# Patient Record
Sex: FEMALE | Race: WHITE | NOT HISPANIC OR LATINO | Employment: UNEMPLOYED | URBAN - NONMETROPOLITAN AREA
[De-identification: names, ages, dates, MRNs, and addresses within clinical notes are randomized per-mention and may not be internally consistent; named-entity substitution may affect disease eponyms.]

---

## 2022-06-21 LAB — EXT SARS-COV-2: NEGATIVE

## 2022-11-08 PROBLEM — M25.531 RIGHT WRIST PAIN: Status: ACTIVE | Noted: 2022-11-08

## 2023-02-22 PROBLEM — I25.10 CAD (CORONARY ARTERY DISEASE): Status: ACTIVE | Noted: 2023-02-22

## 2023-03-09 PROBLEM — Z00.8 MEDICAL CLEARANCE FOR PSYCHIATRIC ADMISSION: Status: RESOLVED | Noted: 2023-02-22 | Resolved: 2023-03-09

## 2023-05-01 PROBLEM — M25.572 ACUTE LEFT ANKLE PAIN: Status: ACTIVE | Noted: 2023-05-01

## 2023-05-25 DIAGNOSIS — D64.9 ANEMIA: ICD-10-CM

## 2023-05-25 RX ORDER — FERROUS SULFATE 325(65) MG
TABLET ORAL
Qty: 30 TABLET | Refills: 0 | Status: SHIPPED | OUTPATIENT
Start: 2023-05-25

## 2023-05-30 ENCOUNTER — OFFICE VISIT (OUTPATIENT)
Dept: OBGYN CLINIC | Facility: CLINIC | Age: 43
End: 2023-05-30

## 2023-05-30 VITALS
SYSTOLIC BLOOD PRESSURE: 120 MMHG | DIASTOLIC BLOOD PRESSURE: 80 MMHG | BODY MASS INDEX: 38.46 KG/M2 | HEART RATE: 84 BPM | WEIGHT: 209 LBS | TEMPERATURE: 97.4 F | HEIGHT: 62 IN

## 2023-05-30 DIAGNOSIS — M76.61 ACHILLES TENDINITIS OF BOTH LOWER EXTREMITIES: ICD-10-CM

## 2023-05-30 DIAGNOSIS — G57.52 TARSAL TUNNEL SYNDROME OF LEFT SIDE: Primary | ICD-10-CM

## 2023-05-30 DIAGNOSIS — E11.42 TYPE 2 DIABETES MELLITUS WITH DIABETIC POLYNEUROPATHY, WITHOUT LONG-TERM CURRENT USE OF INSULIN (HCC): ICD-10-CM

## 2023-05-30 DIAGNOSIS — M79.672 PAIN OF LEFT HEEL: ICD-10-CM

## 2023-05-30 DIAGNOSIS — M79.671 PAIN OF RIGHT HEEL: ICD-10-CM

## 2023-05-30 DIAGNOSIS — M76.62 ACHILLES TENDINITIS OF BOTH LOWER EXTREMITIES: ICD-10-CM

## 2023-05-30 DIAGNOSIS — M25.572 ACUTE LEFT ANKLE PAIN: ICD-10-CM
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
## 2023-05-31 DIAGNOSIS — I95.9 HYPOTENSION: ICD-10-CM

## 2023-05-31 RX ORDER — FLUDROCORTISONE ACETATE 0.1 MG/1
0.1 TABLET ORAL 2 TIMES DAILY
Qty: 60 TABLET | Refills: 0 | Status: SHIPPED | OUTPATIENT
Start: 2023-05-31 | End: 2023-06-26

## 2023-06-05 ENCOUNTER — PATIENT MESSAGE (OUTPATIENT)
Dept: INTERNAL MEDICINE CLINIC | Facility: CLINIC | Age: 43
End: 2023-06-05

## 2023-06-05 DIAGNOSIS — M54.42 CHRONIC BILATERAL LOW BACK PAIN WITH BILATERAL SCIATICA: ICD-10-CM

## 2023-06-05 DIAGNOSIS — G89.29 CHRONIC BILATERAL LOW BACK PAIN WITHOUT SCIATICA: ICD-10-CM

## 2023-06-05 DIAGNOSIS — M54.50 CHRONIC LOW BACK PAIN WITHOUT SCIATICA, UNSPECIFIED BACK PAIN LATERALITY: Primary | ICD-10-CM

## 2023-06-05 DIAGNOSIS — G89.29 CHRONIC LOW BACK PAIN WITHOUT SCIATICA, UNSPECIFIED BACK PAIN LATERALITY: Primary | ICD-10-CM

## 2023-06-05 DIAGNOSIS — M54.41 CHRONIC BILATERAL LOW BACK PAIN WITH BILATERAL SCIATICA: ICD-10-CM

## 2023-06-05 DIAGNOSIS — R13.10 DYSPHAGIA: ICD-10-CM

## 2023-06-05 DIAGNOSIS — G89.29 CHRONIC BILATERAL LOW BACK PAIN WITH BILATERAL SCIATICA: ICD-10-CM

## 2023-06-05 DIAGNOSIS — M54.50 CHRONIC BILATERAL LOW BACK PAIN WITHOUT SCIATICA: ICD-10-CM

## 2023-06-05 RX ORDER — PANTOPRAZOLE SODIUM 20 MG/1
20 TABLET, DELAYED RELEASE ORAL
Qty: 60 TABLET | Refills: 0 | Status: SHIPPED | OUTPATIENT
Start: 2023-06-05 | End: 2023-07-05

## 2023-06-05 RX ORDER — METHOCARBAMOL 500 MG/1
500 TABLET, FILM COATED ORAL 3 TIMES DAILY
Qty: 90 TABLET | Refills: 1 | Status: SHIPPED | OUTPATIENT
Start: 2023-06-05

## 2023-06-06 ENCOUNTER — EVALUATION (OUTPATIENT)
Dept: PHYSICAL THERAPY | Facility: CLINIC | Age: 43
End: 2023-06-06
Payer: COMMERCIAL

## 2023-06-06 DIAGNOSIS — M25.572 ACUTE LEFT ANKLE PAIN: Primary | ICD-10-CM

## 2023-06-06 DIAGNOSIS — G57.52 TARSAL TUNNEL SYNDROME OF LEFT SIDE: ICD-10-CM

## 2023-06-06 DIAGNOSIS — M76.61 ACHILLES TENDINITIS OF BOTH LOWER EXTREMITIES: ICD-10-CM

## 2023-06-06 DIAGNOSIS — M79.671 PAIN OF RIGHT HEEL: ICD-10-CM

## 2023-06-06 DIAGNOSIS — E11.42 TYPE 2 DIABETES MELLITUS WITH DIABETIC POLYNEUROPATHY, WITHOUT LONG-TERM CURRENT USE OF INSULIN (HCC): ICD-10-CM

## 2023-06-06 DIAGNOSIS — M79.672 PAIN OF LEFT HEEL: ICD-10-CM

## 2023-06-06 DIAGNOSIS — M76.62 ACHILLES TENDINITIS OF BOTH LOWER EXTREMITIES: ICD-10-CM

## 2023-06-06 PROCEDURE — 97162 PT EVAL MOD COMPLEX 30 MIN: CPT | Performed by: PHYSICAL THERAPIST

## 2023-06-06 PROCEDURE — 97110 THERAPEUTIC EXERCISES: CPT | Performed by: PHYSICAL THERAPIST

## 2023-06-06 NOTE — PROGRESS NOTES
PT Evaluation     Today's date: 2023  Patient name: Denis Gray  : 1980  MRN: [id number]  Referring provider: Becki Rome MD  Dx:   Encounter Diagnosis     ICD-10-CM    1  Acute left ankle pain  M25 [id number] Ambulatory Referral to Physical Therapy      2  Pain of right heel  M79 [id number] Ambulatory Referral to Physical Therapy      3  Achilles tendinitis of both lower extremities  M76 [id number] Ambulatory Referral to Physical Therapy    [id number]       4  Type 2 diabetes mellitus with diabetic polyneuropathy, without long-term current use of insulin (HCC)  E11 [id number] Ambulatory Referral to Physical Therapy      5  Tarsal tunnel syndrome of left side  G57 [id number] Ambulatory Referral to Physical Therapy      6  Pain of left heel  M79 [id number] Ambulatory Referral to Physical Therapy                     Assessment  Assessment details: Denis Gray is a pleasant 40 y o  female with pmh of DMII/diabetic neuropathy presenting to PT with cc of worsening acute L ankle pain and stiffness  Pt  States pain began approx 2 months ago and is associated with forefoot numbness/shooting pain as well as stiffness  Upon examination, patient was found to have objective deficits as listed below and is displaying ss consistent with achilles tendinopathy and tarsal tunnel syndrome  Pt  Is experiencing subsequent functional deficits including difficulty walking, navigating stairs  Pt  Was educated on role of PT to address issues and given initial HEP  Pt  Would benefit from skilled physical therapy to promote improved function and maximize activity tolerance         Symptom irritability: highUnderstanding of Dx/Px/POC: good   Prognosis: good    Goals    Short Term Goals:  - Decrease pain by 50% in 3 weeks  - Increase ROM by 50% in 4 weeks  - Increase strength by 50% in 4 weeks    Long Term Goals:  - Independent with comprehensive home exercise program at discharge  - Increase Functional Status Measure to: 80  - Increase strength equal to contralateral side at discharge  - Increase ROM to [facility] at discharge      Plan  Patient would benefit from: skilled physical therapy  Planned modality interventions: cryotherapy, unattended electrical stimulation and thermotherapy: hydrocollator packs  Planned therapy interventions: balance, balance/weight bearing training, home exercise program, graded motor, graded exercise, graded activity, gait training, functional ROM exercises, flexibility, therapeutic exercise, therapeutic activities, stretching, strengthening, postural training, patient education, neuromuscular re-education, muscle pump exercises, manual therapy and joint mobilization  Frequency: 2x week  Duration in weeks: 6  Plan of Care beginning date: 6/6/2023  Plan of Care expiration date: 7/18/2023  Treatment plan discussed with: patient        Subjective Evaluation    History of Present Illness  Mechanism of injury: Frandy Mcguire presents to PT with cc of worsening L acute ankle and foot pain  She denies any particular BEVERLY but states she has had history of R tarsal tunnel in past and admits she rarely knows if she has foot injuries due to lack of sensation  She visited orthopedics and was referred to PT and placed in L ASO brace  Pt  Has goals of reducing pain levels and increasing walking tolerance     Pain  Current pain rating: 3  At best pain rating: 3  At worst pain rating: 10  Quality: tight, sharp, pulling, pressure and needle-like  Relieving factors: ice and medications  Aggravating factors: walking, standing, running and lifting  Progression: worsening    Social Support  Steps to enter house: yes  Stairs in house: yes   Lives in: apartment  Lives with: alone    Employment status: not working    Diagnostic Tests  X-ray: abnormal (heel spurs)  Treatments  Previous treatment: medication        Objective     Observations     Additional Observation Details  ASO worn on L appropriately    Tenderness   Left Ankle/Foot   Tenderness in the Achilles insertion, "anterior ankle, anterior talofibular ligament, medial calcaneus, medial malleolus and metatarsal head       Active Range of Motion   Left Ankle/Foot   Dorsiflexion (ke): -4 degrees   Dorsiflexion (kf): 0 degrees     Right Ankle/Foot   Dorsiflexion (ke): -1 degrees   Dorsiflexion (kf): 3 degrees     Passive Range of Motion   Left Ankle/Foot    Dorsiflexion (ke): -1 degrees     Strength/Myotome Testing     Left Ankle/Foot   Dorsiflexion: 3+  Plantar flexion: 3+  Inversion: 3+  Eversion: 3+    Right Ankle/Foot   Normal strength             Precautions: Chronic pain, DMII     Daily Treatment Diary:      Initial Evaluation Date: 06/06/23  Compliance 6/6                     Visit Number 1                    Re-Eval  IE                 MC   Foto Captured Y                           6/6                     Manual                      stm -                     Ankle distraction -                                           Ther-Ex                      bike -                     abc -                     gastroc stretch 4x30\"                     Soleus stretch 4x30\"                     Standing gastroc stretch 4x30\"                                                                                                                                   Neuro Re-Ed                                                                                                Ther-Act                                                               Modalities                                                                                    "

## 2023-06-12 ENCOUNTER — APPOINTMENT (OUTPATIENT)
Dept: PHYSICAL THERAPY | Facility: CLINIC | Age: 43
End: 2023-06-12
Payer: COMMERCIAL

## 2023-06-15 DIAGNOSIS — I25.10 CAD (CORONARY ARTERY DISEASE): ICD-10-CM

## 2023-06-15 RX ORDER — ATORVASTATIN CALCIUM 20 MG/1
20 TABLET, FILM COATED ORAL
Qty: 30 TABLET | Refills: 5 | Status: SHIPPED | OUTPATIENT
Start: 2023-06-15 | End: 2023-12-12

## 2023-06-16 ENCOUNTER — HOSPITAL ENCOUNTER (OUTPATIENT)
Dept: RADIOLOGY | Facility: CLINIC | Age: 43
End: 2023-06-16
Payer: COMMERCIAL

## 2023-06-16 ENCOUNTER — OFFICE VISIT (OUTPATIENT)
Dept: URGENT CARE | Facility: CLINIC | Age: 43
End: 2023-06-16
Payer: COMMERCIAL

## 2023-06-16 VITALS
HEIGHT: 62 IN | WEIGHT: 209 LBS | TEMPERATURE: 97.4 F | OXYGEN SATURATION: 100 % | HEART RATE: 76 BPM | BODY MASS INDEX: 38.46 KG/M2 | SYSTOLIC BLOOD PRESSURE: 118 MMHG | RESPIRATION RATE: 16 BRPM | DIASTOLIC BLOOD PRESSURE: 74 MMHG

## 2023-06-16 DIAGNOSIS — S93.402A SPRAIN OF LEFT ANKLE, UNSPECIFIED LIGAMENT, INITIAL ENCOUNTER: Primary | ICD-10-CM

## 2023-06-16 DIAGNOSIS — S93.402A SPRAIN OF LEFT ANKLE, UNSPECIFIED LIGAMENT, INITIAL ENCOUNTER: ICD-10-CM

## 2023-06-16 PROCEDURE — 73610 X-RAY EXAM OF ANKLE: CPT

## 2023-06-16 PROCEDURE — 99213 OFFICE O/P EST LOW 20 MIN: CPT

## 2023-06-16 NOTE — PATIENT INSTRUCTIONS
Preliminary XR read negative, final read pending  OTC Tylenol for pain  Rest, Ice, Compression, and Elevation  Wear ankle brace for support  Follow up with Orthopedics, Podiatry, and PT   Follow up with PCP in 3-5 days  Proceed to  ER if symptoms worsen  Ankle Sprain   AMBULATORY CARE:   An ankle sprain  happens when 1 or more ligaments in your ankle joint stretch or tear  Ligaments are tough tissues that connect bones  Ligaments support your joints and keep your bones in place  Common signs and symptoms:   Trouble moving your ankle or foot    Pain when you touch or put weight on your ankle    Bruised, swollen, or misshapen ankle    Seek care immediately if:   You have severe pain in your ankle  Your foot or toes are cold or numb  Your ankle becomes more weak or unstable (wobbly)  You are unable to put any weight on your ankle or foot  Your swelling has increased or returned  Call your doctor if:   Your pain does not go away, even after treatment  You have questions or concerns about your condition or care  Treatment:   Medicines:      NSAIDs , such as ibuprofen, help decrease swelling, pain, and fever  This medicine is available with or without a doctor's order  NSAIDs can cause stomach bleeding or kidney problems in certain people  If you take blood thinner medicine, always ask your healthcare provider if NSAIDs are safe for you  Always read the medicine label and follow directions  Acetaminophen  decreases pain and fever  It is available without a doctor's order  Ask how much to take and how often to take it  Follow directions  Read the labels of all other medicines you are using to see if they also contain acetaminophen, or ask your doctor or pharmacist  Acetaminophen can cause liver damage if not taken correctly  Prescription pain medicine  may be given  Ask your healthcare provider how to take this medicine safely  Some prescription pain medicines contain acetaminophen   Do not take other medicines that contain acetaminophen without talking to your healthcare provider  Too much acetaminophen may cause liver damage  Prescription pain medicine may cause constipation  Ask your healthcare provider how to prevent or treat constipation  Surgery  may be needed to repair or replace a torn ligament if your sprain does not heal with other treatments  Your healthcare provider may use screws to attach the bones in your ankle together  The screws may help support your ankle and make it stable  Ask your healthcare provider for more information about surgery to treat your ankle sprain  Self-care:   Use support devices , such as a brace, cast, or splint, to limit your movement and protect your joint  You may need to use crutches to decrease your pain as you move around  Go to physical therapy  as directed  A physical therapist teaches you exercises to help improve movement and strength, and to decrease pain  Rest  your ankle so that it can heal  Return to normal activities as directed  Apply ice  on your ankle for 15 to 20 minutes every hour or as directed  Use an ice pack, or put crushed ice in a plastic bag  Cover the ice pack or bag with a towel before you put it on your injury  Ice helps prevent tissue damage and decreases swelling and pain  Compress  your ankle  Ask if you should wrap an elastic bandage around your injured ligament  An elastic bandage provides support and helps decrease swelling and movement so your joint can heal  Wear as long as directed  Elevate  your ankle above the level of your heart as often as you can  This will help decrease swelling and pain  Prop your ankle on pillows or blankets to keep it elevated comfortably  Prevent another ankle sprain:   Let your ankle heal   Find out how long your ligament needs to heal  Do not do any physical activity until your healthcare provider says it is okay   If you start activity too soon, you may develop a more serious injury  Warm up and stretch before you exercise or play sports  This helps your joints become strong and flexible  Use the right equipment  Always wear shoes that fit well and are made for the activity that you are doing  You may also need ankle supports, elbow and knee pads, or braces  Follow up with your doctor as directed:  Write down your questions so you remember to ask them during your visits  © Copyright [employer] 2022 Information is for End User's use only and may not be sold, redistributed or otherwise used for commercial purposes  The above information is an  only  It is not intended as medical advice for individual conditions or treatments  Talk to your doctor, nurse or pharmacist before following any medical regimen to see if it is safe and effective for you

## 2023-06-16 NOTE — PROGRESS NOTES
[address]        NAME: Tram Quintero is a 40 y o  female  : 1980    MRN: [id number]  DATE: 2023  TIME: 1:47 PM    Assessment and Plan   Sprain of left ankle, unspecified ligament, initial encounter [S93 402A]  1  Sprain of left ankle, unspecified ligament, initial encounter  XR ankle 3+ vw left    Orthopedic injury treatment        Orthopedic injury treatment    Date/Time: 2023 1:00 PM    Performed by: YENIFER Villalobos  Authorized by: Carola Nathan DO    Patient Location:  [facility] Protocol:  Procedure performed by: Geovany Smith RN)  Consent: Verbal consent obtained  Risks and benefits: risks, benefits and alternatives were discussed  Consent given by: patient  Patient understanding: patient states understanding of the procedure being performed  Patient identity confirmed: verbally with patient      Injury location:  Ankle  Location details:  Left ankle  Injury type: Soft tissue  Neurovascular status: Neurovascularly intact    Distal perfusion: normal    Neurological function: normal    Range of motion: reduced    Immobilization:  Brace (Air stirrup ankle )  Neurovascular status: Neurovascularly intact    Distal perfusion: normal    Neurological function: normal    Range of motion: unchanged    Patient tolerance:  Patient tolerated the procedure well with no immediate complications      Preliminary XR read negative for acute osseous abnormality, final read pending  Ankle air stirrup placed by nursing staff  Encouraged continued supportive measures, including RICE and OTC Tylenol for pain  Follow up with Orthopedics, Podiatry, and PT as scheduled  Follow up with PCP in 3-5 days or proceed to emergency department for worsening symptoms  Patient verbalized understanding of instructions given         Patient Instructions     Patient Instructions     Preliminary XR read negative, final read pending  OTC Tylenol for pain  Rest, Ice, Compression, and Elevation  Wear ankle brace for support  Follow up with Orthopedics, Podiatry, and PT   Follow up with PCP in 3-5 days  Proceed to  ER if symptoms worsen  Ankle Sprain   AMBULATORY CARE:   An ankle sprain  happens when 1 or more ligaments in your ankle joint stretch or tear  Ligaments are tough tissues that connect bones  Ligaments support your joints and keep your bones in place  Common signs and symptoms:   • Trouble moving your ankle or foot    • Pain when you touch or put weight on your ankle    • Bruised, swollen, or misshapen ankle    Seek care immediately if:   • You have severe pain in your ankle  • Your foot or toes are cold or numb  • Your ankle becomes more weak or unstable (wobbly)  • You are unable to put any weight on your ankle or foot  • Your swelling has increased or returned  Call your doctor if:   • Your pain does not go away, even after treatment  • You have questions or concerns about your condition or care  Treatment:   • Medicines:      ? NSAIDs , such as ibuprofen, help decrease swelling, pain, and fever  This medicine is available with or without a doctor's order  NSAIDs can cause stomach bleeding or kidney problems in certain people  If you take blood thinner medicine, always ask your healthcare provider if NSAIDs are safe for you  Always read the medicine label and follow directions  ? Acetaminophen  decreases pain and fever  It is available without a doctor's order  Ask how much to take and how often to take it  Follow directions  Read the labels of all other medicines you are using to see if they also contain acetaminophen, or ask your doctor or pharmacist  Acetaminophen can cause liver damage if not taken correctly  ? Prescription pain medicine  may be given  Ask your healthcare provider how to take this medicine safely  Some prescription pain medicines contain acetaminophen  Do not take other medicines that contain acetaminophen without talking to your healthcare provider  Too much acetaminophen may cause liver damage  Prescription pain medicine may cause constipation  Ask your healthcare provider how to prevent or treat constipation  • Surgery  may be needed to repair or replace a torn ligament if your sprain does not heal with other treatments  Your healthcare provider may use screws to attach the bones in your ankle together  The screws may help support your ankle and make it stable  Ask your healthcare provider for more information about surgery to treat your ankle sprain  Self-care:   • Use support devices , such as a brace, cast, or splint, to limit your movement and protect your joint  You may need to use crutches to decrease your pain as you move around  • Go to physical therapy  as directed  A physical therapist teaches you exercises to help improve movement and strength, and to decrease pain  • Rest  your ankle so that it can heal  Return to normal activities as directed  • Apply ice  on your ankle for 15 to 20 minutes every hour or as directed  Use an ice pack, or put crushed ice in a plastic bag  Cover the ice pack or bag with a towel before you put it on your injury  Ice helps prevent tissue damage and decreases swelling and pain  • Compress  your ankle  Ask if you should wrap an elastic bandage around your injured ligament  An elastic bandage provides support and helps decrease swelling and movement so your joint can heal  Wear as long as directed  • Elevate  your ankle above the level of your heart as often as you can  This will help decrease swelling and pain  Prop your ankle on pillows or blankets to keep it elevated comfortably  Prevent another ankle sprain:   • Let your ankle heal   Find out how long your ligament needs to heal  Do not do any physical activity until your healthcare provider says it is okay  If you start activity too soon, you may develop a more serious injury      • Warm up and stretch before you exercise or play "sports  This helps your joints become strong and flexible  • Use the right equipment  Always wear shoes that fit well and are made for the activity that you are doing  You may also need ankle supports, elbow and knee pads, or braces  Follow up with your doctor as directed:  Write down your questions so you remember to ask them during your visits  © Copyright [facility] 2022 Information is for End User's use only and may not be sold, redistributed or otherwise used for commercial purposes  The above information is an  only  It is not intended as medical advice for individual conditions or treatments  Talk to your doctor, nurse or pharmacist before following any medical regimen to see if it is safe and effective for you  Chief Complaint     Chief Complaint   Patient presents with   • Ankle Pain     C/o Left lateral ankle pain after \"twisting ankle\"while walking 4 days ago  Seeing a podiatrist and orthopedic doctor for Tarsal tunnel in the same ankle  History of Present Illness       71-year-old female with a past medical history significant for uncontrolled type II DM- insulin dependent, peripheral neuropathy, and hypertension presents with complaints of left-sided ankle pain x4 days  Patient states that she has decreased sensation to bilateral feet due to neuropathy and believes she rolled/twisted her left ankle on Monday  She is concerned because the pain is now radiating up the outer aspect of her lower leg  She has been wearing an ankle brace, which patient states has worsened her symptoms  She is able to ambulate and bear weight but with some difficulty and pain increases with movement  She has not been taking any OTC medications for her symptoms but has been applying ice  Of note, patient currently following with orthopedics, podiatry, physical therapy for tarsal tunnel syndrome and Achilles tendinitis        Review of Systems   Review of Systems   Constitutional: " Negative for chills and fever  HENT: Negative for congestion, ear discharge, ear pain, rhinorrhea, sore throat, trouble swallowing and voice change  Eyes: Negative for discharge  Respiratory: Negative for shortness of breath  Cardiovascular: Negative for chest pain  Gastrointestinal: Negative for abdominal pain, diarrhea, nausea and vomiting  Musculoskeletal: Positive for arthralgias, gait problem and joint swelling  Skin: Negative for color change  Neurological: Negative for weakness and numbness  Current Medications       Current Outpatient Medications:   •  albuterol (Ventolin HFA) 90 mcg/act inhaler, Inhale 2 puffs every 6 (six) hours as needed for wheezing, Disp: 18 g, Rfl: 5  •  aspirin (ECOTRIN LOW STRENGTH) 81 mg EC tablet, Take 1 tablet (81 mg total) by mouth daily, Disp: 30 tablet, Rfl: 5  •  atorvastatin (LIPITOR) 20 mg tablet, Take 1 tablet (20 mg total) by mouth daily with dinner, Disp: 30 tablet, Rfl: 5  •  Blood Glucose Monitoring Suppl (OneTouch Verio Reflect) w/Device KIT, Check blood sugars four times daily  Please substitute with appropriate alternative as covered by patient's insurance   Dx: E11 65, Disp: 1 kit, Rfl: 0  •  diclofenac (VOLTAREN) 75 mg EC tablet, Take 1 tablet (75 mg total) by mouth 2 (two) times a day (Patient taking differently: Take 75 mg by mouth if needed), Disp: 60 tablet, Rfl: 2  •  ferrous sulfate 325 (65 Fe) mg tablet, take 1 tablet by mouth once daily WITH BREAKFAST, Disp: 30 tablet, Rfl: 0  •  fludrocortisone (FLORINEF) 0 1 mg tablet, Take 1 tablet (0 1 mg total) by mouth 2 (two) times a day, Disp: 60 tablet, Rfl: 0  •  glucose blood test strip, Test 4 times daily as directed , Disp: 100 strip, Rfl: 5  •  Insulin Glargine Solostar (Lantus SoloStar) 100 UNIT/ML SOPN, Inject 0 4 mL (40 Units total) under the skin daily at bedtime, Disp: 15 mL, Rfl: 0  •  insulin lispro (HumaLOG KwikPen) 100 units/mL injection pen, Inject 10 Units under the skin 3 (three) times a day with meals, Disp: 15 mL, Rfl: 0  •  Insulin Pen Needle (Pen Needles) 30G X 5 MM MISC, Use 2 (two) times a day, Disp: 100 each, Rfl: 2  •  lamoTRIgine (LaMICtal) 25 mg tablet, Take 1 tablet (25 mg total) by mouth daily, Disp: 30 tablet, Rfl: 1  •  levothyroxine 25 mcg tablet, Take 1 tablet (25 mcg total) by mouth daily in the early morning, Disp: 30 tablet, Rfl: 0  •  methocarbamol (ROBAXIN) 500 mg tablet, Take 1 tablet (500 mg total) by mouth 3 (three) times a day (Patient taking differently: Take 500 mg by mouth daily as needed), Disp: 90 tablet, Rfl: 1  •  metoprolol succinate (TOPROL-XL) 25 mg 24 hr tablet, Take 1 tablet (25 mg total) by mouth daily, Disp: 30 tablet, Rfl: 0  •  OneTouch Delica Lancets 71L MISC, Check blood sugars four times daily  Please substitute with appropriate alternative as covered by patient's insurance   Dx: E11 65, Disp: 400 each, Rfl: 3  •  pantoprazole (PROTONIX) 20 mg tablet, Take 1 tablet (20 mg total) by mouth 2 (two) times a day before meals, Disp: 60 tablet, Rfl: 0  •  pregabalin (LYRICA) 75 mg capsule, Take 1 capsule (75 mg total) by mouth 2 (two) times a day, Disp: 60 capsule, Rfl: 2  •  topiramate (TOPAMAX) 50 MG tablet, Take 1 tablet (50 mg total) by mouth 2 (two) times a day, Disp: 60 tablet, Rfl: 1  •  traZODone (DESYREL) 100 mg tablet, Take 200 mg by mouth daily at bedtime, Disp: , Rfl:   •  venlafaxine (EFFEXOR-XR) 75 mg 24 hr capsule, Take 3 capsules (225 mg total) by mouth daily, Disp: 90 capsule, Rfl: 1  •  hydrOXYzine HCL (ATARAX) 25 mg tablet, Take 1 tablet (25 mg total) by mouth 2 (two) times a day (Patient taking differently: Take 25 mg by mouth every 6 (six) hours as needed), Disp: 60 tablet, Rfl: 1  •  hydrOXYzine HCL (ATARAX) 50 mg tablet, Take 1 tablet (50 mg total) by mouth daily at bedtime, Disp: 30 tablet, Rfl: 1  •  traZODone (DESYREL) 150 mg tablet, Take 1 tablet (150 mg total) by mouth daily at bedtime, Disp: 30 tablet, Rfl: 1    Current "Allergies     Allergies as of 2023 - Reviewed 2023   Allergen Reaction Noted   • Orange (diagnostic) - food allergy Hives and Itching 2021   • Orange fruit [citrus - food allergy] Hives 10/26/2021   • Oxycodone-acetaminophen Itching 2015            The following portions of the patient's history were reviewed and updated as appropriate: allergies, current medications, past family history, past medical history, past social history, past surgical history and problem list      Past Medical History:   Diagnosis Date   • Anxiety     Resolved:16   • Colon polyp    • Depression 2018   • Diabetes mellitus ([facility] )    • GERD (gastroesophageal reflux disease)    • Migraine    • Mood disorder ([facility] )    • Neuropathy    • Obesity    • Peripheral neuropathy    • PTSD (post-traumatic stress disorder)    • Sacroiliitis ([facility] ) 10/19/2018   • Sleep difficulties        Past Surgical History:   Procedure Laterality Date   • CARDIAC CATHETERIZATION     •  SECTION     • CHOLECYSTECTOMY     • FOOT SURGERY Right    • TUBAL LIGATION         Family History   Problem Relation Age of Onset   • Heart failure Mother    • Diabetes Mother    • Heart failure Father    • Diabetes Father    • Diabetes Brother          Medications have been verified  Objective   /74   Pulse 76   Temp (!) 97 4 °F (36 3 °C)   Resp 16   Ht 5' 2\" (1 575 m)   Wt 94 8 kg (209 lb)   LMP 2023   SpO2 100%   BMI 38 23 kg/m²   Patient's last menstrual period was 2023  Physical Exam     Physical Exam  Vitals and nursing note reviewed  Constitutional:       General: She is not in acute distress  Appearance: She is not toxic-appearing  HENT:      Head: Normocephalic  Nose: Nose normal       Mouth/Throat:      Mouth: Mucous membranes are moist       Pharynx: Oropharynx is clear     Eyes:      Conjunctiva/sclera: Conjunctivae normal    Pulmonary:      Effort: Pulmonary effort is normal  " Musculoskeletal:      Left lower leg: Normal       Left ankle: Swelling present  No ecchymosis  Tenderness present over the lateral malleolus  Decreased range of motion  Left foot: Normal  No tenderness or bony tenderness  Comments: TTP over lateral aspect of left ankle with mild swelling, no ecchymosis  Limited ROM due to complaints of pain  Skin:     General: Skin is warm and dry  Neurological:      Mental Status: She is alert and oriented to person, place, and time  Sensory: Sensation is intact  Motor: Motor function is intact  Gait: Gait is intact     Psychiatric:         Mood and Affect: Mood normal          Behavior: Behavior normal

## 2023-06-17 NOTE — PROGRESS NOTES
[facility] - PROCEDURE RESPONSE NOTE    Based on the query that was sent to you, please document below any procedures that were performed  A left ankle air cast air ankle stirrup was placed on the patient  I would not categorize this as a splint, but rather a brace

## 2023-06-19 ENCOUNTER — APPOINTMENT (OUTPATIENT)
Dept: PHYSICAL THERAPY | Facility: CLINIC | Age: 43
End: 2023-06-19
Payer: COMMERCIAL

## 2023-06-24 DIAGNOSIS — D64.9 ANEMIA: ICD-10-CM

## 2023-06-26 ENCOUNTER — APPOINTMENT (OUTPATIENT)
Dept: PHYSICAL THERAPY | Facility: CLINIC | Age: 43
End: 2023-06-26
Payer: COMMERCIAL

## 2023-06-26 DIAGNOSIS — I95.9 HYPOTENSION: ICD-10-CM

## 2023-06-26 RX ORDER — FERROUS SULFATE 325(65) MG
TABLET ORAL
Qty: 30 TABLET | Refills: 5 | Status: SHIPPED | OUTPATIENT
Start: 2023-06-26

## 2023-06-26 RX ORDER — FLUDROCORTISONE ACETATE 0.1 MG/1
TABLET ORAL
Qty: 60 TABLET | Refills: 0 | Status: SHIPPED | OUTPATIENT
Start: 2023-06-26

## 2023-06-30 ENCOUNTER — HOSPITAL ENCOUNTER (OUTPATIENT)
Dept: RADIOLOGY | Facility: CLINIC | Age: 43
End: 2023-06-30
Payer: COMMERCIAL

## 2023-06-30 ENCOUNTER — OFFICE VISIT (OUTPATIENT)
Dept: PODIATRY | Age: 43
End: 2023-06-30
Payer: COMMERCIAL

## 2023-06-30 VITALS
DIASTOLIC BLOOD PRESSURE: 80 MMHG | HEART RATE: 90 BPM | HEIGHT: 62 IN | BODY MASS INDEX: 39.75 KG/M2 | SYSTOLIC BLOOD PRESSURE: 115 MMHG | WEIGHT: 216 LBS | TEMPERATURE: 97.9 F

## 2023-06-30 DIAGNOSIS — M25.572 ACUTE LEFT ANKLE PAIN: ICD-10-CM

## 2023-06-30 DIAGNOSIS — M76.61 ACHILLES TENDINITIS OF BOTH LOWER EXTREMITIES: ICD-10-CM

## 2023-06-30 DIAGNOSIS — M79.672 PAIN OF LEFT HEEL: ICD-10-CM

## 2023-06-30 DIAGNOSIS — G57.52 TARSAL TUNNEL SYNDROME OF LEFT SIDE: ICD-10-CM

## 2023-06-30 DIAGNOSIS — M79.671 PAIN OF RIGHT HEEL: ICD-10-CM

## 2023-06-30 DIAGNOSIS — M76.62 ACHILLES TENDINITIS OF BOTH LOWER EXTREMITIES: ICD-10-CM

## 2023-06-30 DIAGNOSIS — E11.42 TYPE 2 DIABETES MELLITUS WITH DIABETIC POLYNEUROPATHY, WITHOUT LONG-TERM CURRENT USE OF INSULIN (HCC): ICD-10-CM

## 2023-06-30 PROCEDURE — 29580 STRAPPING UNNA BOOT: CPT | Performed by: STUDENT IN AN ORGANIZED HEALTH CARE EDUCATION/TRAINING PROGRAM

## 2023-06-30 PROCEDURE — 73630 X-RAY EXAM OF FOOT: CPT

## 2023-06-30 PROCEDURE — 99244 OFF/OP CNSLTJ NEW/EST MOD 40: CPT | Performed by: STUDENT IN AN ORGANIZED HEALTH CARE EDUCATION/TRAINING PROGRAM

## 2023-06-30 RX ORDER — MELOXICAM 7.5 MG/1
7.5 TABLET ORAL DAILY
Qty: 10 TABLET | Refills: 0 | Status: SHIPPED | OUTPATIENT
Start: 2023-06-30

## 2023-06-30 NOTE — PROGRESS NOTES
Assessment/Plan:     Diagnoses and all orders for this visit:    Acute left ankle pain  -     Ambulatory Referral to Podiatry  -     meloxicam (Mobic) 7 5 mg tablet; Take 1 tablet (7 5 mg total) by mouth daily  -     EMG 1 Limb; Future    Achilles tendinitis of both lower extremities  -     Ambulatory Referral to Podiatry  -     X-ray foot left 3+ views; Future  -     X-ray foot right 3+ views; Future    Type 2 diabetes mellitus with diabetic polyneuropathy, without long-term current use of insulin ([facility] )  -     Ambulatory Referral to Podiatry  -     EMG 1 Limb; Future    Tarsal tunnel syndrome of left side  -     Ambulatory Referral to Podiatry  -     Ambulatory Referral to Pain Management; Future  -     EMG 1 Limb; Future    Pain of left heel  -     Ambulatory Referral to Podiatry  -     X-ray foot left 3+ views; Future          Imaging Reviewed at this visit (I personally reviewed/independently interpreted images and reports in PACS)  · XR left ankle WB 3v 4/28/23: no acute osseous abnormality  · XR left ankle 3v NWB 6/16/23: no acute osseous abnormality  · Right and left foot 6v 6/30/23: small plantar and posterior calcaneal spurs  Right TNJ arthritic changes  IMPRESSION:  · Acute left ankle pain and swelling  · Left medial ankle pain likely tarsal tunnel syndomre  • Uncontrolled type 2 diabetes with chronic bilateral lower extremity neuropathy, A1c 9 5% 4/1/23   • BMI 38     PLAN:  · I reviewed clinical exam and radiographic imaging (XR's) with patient in detail today  I have discussed with the patient the pathophysiology of this diagnosis and reviewed how the examination correlates with this diagnosis  · I reviewed Dr Lupis Siddiqui note from 5/30/23  Was given lace up brace and recommended heel cups plus PT   · I reviewed urgent care note from 6/16/23  · No right foot pain today     · Patient notes left medial ankle/plantar foot N/T/B (present since March 2023) which is concerning for tarsal tunnel syndrome  I ordered EMG to further assess and referred to pain mngmt as patient has high A1c and not a candidate for surgery at this time  · Patient notes left ankle swelling and lateral pain without inciting event for about one month  She has baseline diabetic peripheral neuropathy and explains that she may have injured herself however is uncertain  XR's negative for fracture or dislocation  Differentials include ankle effusion, venous stasis, lateral ankle sprain  I applied an unna boot as below, recommended LE elevation, c/w ankle brace as previously given by Dr Zackery Ahuja and ordered meloxicam  Will consider MRI and basic labs next appt if no improvement  · Meloxicam 7 5 mg daily rx'd for 10 days for pain  Do not take with other NSAIDs  Patient educated regarding bleeding risk of taking this medication as well as not taking any other nonsteroidal anti-inflammatory medications while taking this medication; counseled thoroughly regarding potential risk of Cardiovascular injury, Kidney injury, Gastrointestinal ulceration/bleeding  Patient voiced understanding  · F/u 10 days for recheck    Splint, Casting, Strapping    Date/Time: 6/30/2023 1:30 PM    Performed by: Jose Webber DPM  Authorized by: Jose Webber DPM  Universal Protocol:  Consent: Verbal consent obtained  Risks and benefits: risks, benefits and alternatives were discussed  Consent given by: patient  Patient understanding: patient states understanding of the procedure being performed  Patient consent: the patient's understanding of the procedure matches consent given  Patient identity confirmed: verbally with patient      Pre-procedure details:     Sensation:  Unchanged  Procedure details:     Laterality:  Left    Location:  Leg    Leg:  L lower leg    Strapping: no  Cast type:  Unna boot      Supplies:  2 layer wrap  Post-procedure details:     Pain:  Improved    Sensation:  Unchanged    Patient tolerance of procedure:   Tolerated well, no "immediate complications        Subjective:      Patient ID: Annie Combs is a 40 y o  female  [facility] presents to clinic today concerning left ankle and foot pain and diabetic foot check  Notes the inner ankle has been hurting since March and described as N/T/B  She has h/o TTS with surgery to right foot and notes symptoms are the same as prior to surgery  She went to Dr Bisi Anderson last month who referred her to me for consult  Notes left lateral and anterior ankle pain and swelling for the past month without inciting event  Notes her feet are baseline numb due to her DM thus she is unsure if she hurt herself  She went to urgent care who referred her here  No pain to the right foot today  DM w/ PN A1c 9 5%      The following portions of the patient's history were reviewed and updated as appropriate: allergies, current medications, past family history, past medical history, past social history, past surgical history and problem list     Review of Systems   Constitutional: Negative for activity change, chills and fever  HENT: Negative  Respiratory: Negative for cough, chest tightness and shortness of breath  Cardiovascular: Negative for chest pain and leg swelling  Endocrine: Negative  Genitourinary: Negative  Musculoskeletal: Positive for gait problem (Left foot/ankle pain) and joint swelling (Left ankle)  Neurological: Positive for numbness (B/L feet)  Psychiatric/Behavioral: Negative  Negative for agitation and behavioral problems  Objective:      /80 (BP Location: Left arm)   Pulse 90   Temp 97 9 °F (36 6 °C) (Temporal)   Ht 5' 2\" (1 575 m)   Wt 98 kg (216 lb)   LMP 05/25/2023   BMI 39 51 kg/m²          Physical Exam  Cardiovascular:      Pulses: Pulses are weak  Dorsalis pedis pulses are 1+ on the right side and 1+ on the left side  Posterior tibial pulses are 1+ on the right side and 1+ on the left side     Musculoskeletal:         General: Swelling (Left " ankle, especially lateral) and tenderness (Left tarsal tunnel  Left lateral ankle and ankle joint) present  No signs of injury  Comments: ROM left ankle intact and without pain  + Pain with palpation to ATFL, lateral ankle joint, posterior ankle joint  Feet:      Right foot:      Skin integrity: Dry skin present  No ulcer, skin breakdown, erythema, warmth or callus  Left foot:      Skin integrity: Dry skin present  No ulcer, skin breakdown, erythema, warmth or callus  Skin:     Findings: No erythema or lesion  Neurological:      Comments: + N/T/B with palpation and tinel to left medial tarsal tunnel region  Diabetic Foot Exam    Patient's shoes and socks removed  Right Foot/Ankle   Right Foot Inspection  Skin Exam: skin normal, skin intact and dry skin  No warmth, no callus, no erythema, no maceration, no abnormal color, no pre-ulcer, no ulcer and no callus  Toe Exam: right toe deformity (HTs)  Sensory   Vibration: absent  Proprioception: absent  Monofilament testing: absent    Vascular  Capillary refills: < 3 seconds  The right DP pulse is 1+  The right PT pulse is 1+  Left Foot/Ankle  Left Foot Inspection  Skin Exam: skin normal, skin intact and dry skin  No warmth, no erythema, no maceration, normal color, no pre-ulcer, no ulcer and no callus  Toe Exam: left toe deformity (HTs)  No swelling  Sensory   Vibration: absent  Proprioception: absent  Monofilament testing: absent    Vascular  Capillary refills: < 3 seconds  The left DP pulse is 1+  The left PT pulse is 1+       Assign Risk Category  Deformity present  Loss of protective sensation  Weak pulses  Risk: 2

## 2023-06-30 NOTE — PATIENT INSTRUCTIONS
Wear supportive shoes at all times  Avoid flip-flops, flat sandals, barefoot walking (never walk barefoot, even at home)  Generally avoid shoes that are too flexible and bend in the arch  Your shoes should only slightly bend in the toe area, not the middle  Running sneakers are often the best choice  Supportive sneaker brands: Mckenna Whiting, [employer], [facility], [address] [facility] [address], [address], [place] Pavel (discount if mention Dr referred)  Supportive daily shoe brands: Vionic, Orthofeet, [place], Dansko, [place] branch, [employer], [place], [employer]ken[employer]  Supportive home shoes: Yue Martinez (recovery slides)  Purchase over the counter topical pain creams such as Voltarin gel, biofreeze, or CBD cream - will need to apply 2-3 times per day for benefit  + deep tissue massage  Look in to over the counter shoe inserts/arch supports such as Nikkie Austin  These are all available on [employer]  com as well as their individual website's  Leave special dressing intact for 5-7 days then remove carefully    Rest, elevate

## 2023-07-12 ENCOUNTER — HOSPITAL ENCOUNTER (EMERGENCY)
Facility: HOSPITAL | Age: 43
Discharge: HOME | End: 2023-07-13
Attending: EMERGENCY MEDICINE
Payer: COMMERCIAL

## 2023-07-12 DIAGNOSIS — R07.9 CHEST PAIN, UNSPECIFIED TYPE: Primary | ICD-10-CM

## 2023-07-12 PROCEDURE — 93005 ELECTROCARDIOGRAM TRACING: CPT

## 2023-07-13 ENCOUNTER — APPOINTMENT (EMERGENCY)
Dept: RADIOLOGY | Facility: HOSPITAL | Age: 43
End: 2023-07-13
Payer: COMMERCIAL

## 2023-07-13 VITALS
RESPIRATION RATE: 20 BRPM | BODY MASS INDEX: 38.09 KG/M2 | HEIGHT: 63 IN | TEMPERATURE: 98.3 F | OXYGEN SATURATION: 99 % | DIASTOLIC BLOOD PRESSURE: 92 MMHG | WEIGHT: 215 LBS | HEART RATE: 84 BPM | SYSTOLIC BLOOD PRESSURE: 149 MMHG

## 2023-07-13 LAB
2HR DELTA HS TROPONIN: 0 NG/L
ALBUMIN SERPL BCP-MCNC: 3.6 G/DL (ref 3.5–5)
ALP SERPL-CCNC: 107 U/L (ref 34–104)
ALT SERPL W P-5'-P-CCNC: 26 U/L (ref 7–52)
ANION GAP SERPL CALCULATED.3IONS-SCNC: 9 MMOL/L
AST SERPL W P-5'-P-CCNC: 22 U/L (ref 13–39)
ATRIAL RATE: 68 BPM
ATRIAL RATE: 98 BPM
BASOPHILS # BLD AUTO: 0.05 THOUSANDS/ÂΜL (ref 0–0.1)
BASOPHILS NFR BLD AUTO: 1 % (ref 0–1)
BILIRUB SERPL-MCNC: 0.31 MG/DL (ref 0.2–1)
BUN SERPL-MCNC: 8 MG/DL (ref 5–25)
CALCIUM SERPL-MCNC: 8.4 MG/DL (ref 8.4–10.2)
CARDIAC TROPONIN I PNL SERPL HS: 4 NG/L
CARDIAC TROPONIN I PNL SERPL HS: 4 NG/L
CHLORIDE SERPL-SCNC: 106 MMOL/L (ref 96–108)
CO2 SERPL-SCNC: 21 MMOL/L (ref 21–32)
CREAT SERPL-MCNC: 0.88 MG/DL (ref 0.6–1.3)
EOSINOPHIL # BLD AUTO: 0.12 THOUSAND/ÂΜL (ref 0–0.61)
EOSINOPHIL NFR BLD AUTO: 1 % (ref 0–6)
ERYTHROCYTE [DISTWIDTH] IN BLOOD BY AUTOMATED COUNT: 15.4 % (ref 11.6–15.1)
GFR SERPL CREATININE-BSD FRML MDRD: 81 ML/MIN/1.73SQ M
GLUCOSE SERPL-MCNC: 222 MG/DL (ref 65–140)
HCT VFR BLD AUTO: 37 % (ref 34.8–46.1)
HGB BLD-MCNC: 12.3 G/DL (ref 11.5–15.4)
IMM GRANULOCYTES # BLD AUTO: 0.02 THOUSAND/UL (ref 0–0.2)
IMM GRANULOCYTES NFR BLD AUTO: 0 % (ref 0–2)
LYMPHOCYTES # BLD AUTO: 2.71 THOUSANDS/ÂΜL (ref 0.6–4.47)
LYMPHOCYTES NFR BLD AUTO: 30 % (ref 14–44)
MCH RBC QN AUTO: 28.4 PG (ref 26.8–34.3)
MCHC RBC AUTO-ENTMCNC: 33.2 G/DL (ref 31.4–37.4)
MCV RBC AUTO: 86 FL (ref 82–98)
MONOCYTES # BLD AUTO: 0.62 THOUSAND/ÂΜL (ref 0.17–1.22)
MONOCYTES NFR BLD AUTO: 7 % (ref 4–12)
NEUTROPHILS # BLD AUTO: 5.44 THOUSANDS/ÂΜL (ref 1.85–7.62)
NEUTS SEG NFR BLD AUTO: 61 % (ref 43–75)
NRBC BLD AUTO-RTO: 0 /100 WBCS
P AXIS: 44 DEGREES
P AXIS: 45 DEGREES
PLATELET # BLD AUTO: 211 THOUSANDS/UL (ref 149–390)
PMV BLD AUTO: 11 FL (ref 8.9–12.7)
POTASSIUM SERPL-SCNC: 3.4 MMOL/L (ref 3.5–5.3)
PR INTERVAL: 138 MS
PR INTERVAL: 142 MS
PROT SERPL-MCNC: 6.9 G/DL (ref 6.4–8.4)
QRS AXIS: 3 DEGREES
QRS AXIS: 3 DEGREES
QRSD INTERVAL: 68 MS
QRSD INTERVAL: 68 MS
QT INTERVAL: 374 MS
QT INTERVAL: 450 MS
QTC INTERVAL: 477 MS
QTC INTERVAL: 478 MS
RBC # BLD AUTO: 4.33 MILLION/UL (ref 3.81–5.12)
SODIUM SERPL-SCNC: 136 MMOL/L (ref 135–147)
T WAVE AXIS: 17 DEGREES
T WAVE AXIS: 20 DEGREES
VENTRICULAR RATE: 68 BPM
VENTRICULAR RATE: 98 BPM
WBC # BLD AUTO: 8.96 THOUSAND/UL (ref 4.31–10.16)

## 2023-07-13 PROCEDURE — 84484 ASSAY OF TROPONIN QUANT: CPT | Performed by: EMERGENCY MEDICINE

## 2023-07-13 PROCEDURE — 71045 X-RAY EXAM CHEST 1 VIEW: CPT

## 2023-07-13 PROCEDURE — 36415 COLL VENOUS BLD VENIPUNCTURE: CPT | Performed by: EMERGENCY MEDICINE

## 2023-07-13 PROCEDURE — 85025 COMPLETE CBC W/AUTO DIFF WBC: CPT | Performed by: EMERGENCY MEDICINE

## 2023-07-13 PROCEDURE — 80053 COMPREHEN METABOLIC PANEL: CPT | Performed by: EMERGENCY MEDICINE

## 2023-07-13 PROCEDURE — 93005 ELECTROCARDIOGRAM TRACING: CPT

## 2023-07-13 RX ORDER — ACETAMINOPHEN 325 MG/1
975 TABLET ORAL ONCE
Status: COMPLETED | OUTPATIENT
Start: 2023-07-13 | End: 2023-07-13

## 2023-07-13 RX ORDER — SODIUM CHLORIDE 9 MG/ML
3 INJECTION INTRAVENOUS
Status: DISCONTINUED | OUTPATIENT
Start: 2023-07-13 | End: 2023-07-13 | Stop reason: HOSPADM

## 2023-07-13 RX ADMIN — ACETAMINOPHEN 975 MG: 325 TABLET ORAL at 02:36

## 2023-07-13 NOTE — DISCHARGE INSTRUCTIONS
Laboratory and imaging studies that were obtained did not show any significant abnormalities. Continue all prescribed medications. Follow-up with your primary care provider. Do not hesitate to be reevaluated in the ED for any concerning signs or symptoms.
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
## 2023-07-18 DIAGNOSIS — R51.9 NONINTRACTABLE HEADACHE, UNSPECIFIED CHRONICITY PATTERN, UNSPECIFIED HEADACHE TYPE: ICD-10-CM

## 2023-07-18 DIAGNOSIS — F33.9 RECURRENT MAJOR DEPRESSIVE DISORDER (HCC): ICD-10-CM

## 2023-07-18 RX ORDER — TOPIRAMATE 50 MG/1
50 TABLET, FILM COATED ORAL 2 TIMES DAILY
Qty: 60 TABLET | Refills: 2 | Status: SHIPPED | OUTPATIENT
Start: 2023-07-18 | End: 2023-10-16

## 2023-07-24 ENCOUNTER — TELEPHONE (OUTPATIENT)
Dept: INTERNAL MEDICINE CLINIC | Facility: CLINIC | Age: 43
End: 2023-07-24

## 2023-07-24 DIAGNOSIS — R00.2 HEART PALPITATIONS: ICD-10-CM

## 2023-07-24 DIAGNOSIS — M79.18 MYOFASCIAL PAIN SYNDROME: ICD-10-CM

## 2023-07-24 RX ORDER — METOPROLOL SUCCINATE 25 MG/1
25 TABLET, EXTENDED RELEASE ORAL DAILY
Qty: 30 TABLET | Refills: 0 | Status: CANCELLED | OUTPATIENT
Start: 2023-07-24 | End: 2023-08-23

## 2023-07-24 RX ORDER — PREGABALIN 75 MG/1
75 CAPSULE ORAL 2 TIMES DAILY
Qty: 60 CAPSULE | Refills: 2 | Status: CANCELLED | OUTPATIENT
Start: 2023-07-24

## 2023-07-25 DIAGNOSIS — M79.18 MYOFASCIAL PAIN SYNDROME: ICD-10-CM

## 2023-07-25 DIAGNOSIS — I95.9 HYPOTENSION: ICD-10-CM

## 2023-07-25 DIAGNOSIS — R00.2 HEART PALPITATIONS: ICD-10-CM

## 2023-07-25 RX ORDER — METOPROLOL SUCCINATE 25 MG/1
25 TABLET, EXTENDED RELEASE ORAL DAILY
Qty: 30 TABLET | Refills: 0 | Status: SHIPPED | OUTPATIENT
Start: 2023-07-25 | End: 2023-08-19

## 2023-07-25 RX ORDER — PREGABALIN 75 MG/1
75 CAPSULE ORAL 2 TIMES DAILY
Qty: 60 CAPSULE | Refills: 2 | Status: SHIPPED | OUTPATIENT
Start: 2023-07-25

## 2023-07-26 RX ORDER — FLUDROCORTISONE ACETATE 0.1 MG/1
TABLET ORAL
Qty: 60 TABLET | Refills: 0 | Status: SHIPPED | OUTPATIENT
Start: 2023-07-26

## 2023-08-07 DIAGNOSIS — E11.42 TYPE 2 DIABETES MELLITUS WITH DIABETIC POLYNEUROPATHY, WITHOUT LONG-TERM CURRENT USE OF INSULIN (HCC): ICD-10-CM

## 2023-08-07 RX ORDER — INSULIN LISPRO 100 [IU]/ML
10 INJECTION, SOLUTION INTRAVENOUS; SUBCUTANEOUS
Qty: 15 ML | Refills: 0 | Status: SHIPPED | OUTPATIENT
Start: 2023-08-07

## 2023-08-11 DIAGNOSIS — R53.82 CHRONIC FATIGUE: Primary | ICD-10-CM

## 2023-08-11 DIAGNOSIS — R13.10 DYSPHAGIA: ICD-10-CM

## 2023-08-11 RX ORDER — PANTOPRAZOLE SODIUM 20 MG/1
20 TABLET, DELAYED RELEASE ORAL
Qty: 60 TABLET | Refills: 2 | Status: SHIPPED | OUTPATIENT
Start: 2023-08-11 | End: 2023-11-09

## 2023-08-19 DIAGNOSIS — R00.2 HEART PALPITATIONS: ICD-10-CM

## 2023-08-19 RX ORDER — METOPROLOL SUCCINATE 25 MG/1
25 TABLET, EXTENDED RELEASE ORAL DAILY
Qty: 30 TABLET | Refills: 0 | Status: SHIPPED | OUTPATIENT
Start: 2023-08-19

## 2023-08-31 DIAGNOSIS — R39.9 UTI SYMPTOMS: Primary | ICD-10-CM

## 2023-09-01 ENCOUNTER — APPOINTMENT (OUTPATIENT)
Dept: LAB | Facility: CLINIC | Age: 43
End: 2023-09-01
Payer: COMMERCIAL

## 2023-09-01 DIAGNOSIS — R39.9 UTI SYMPTOMS: ICD-10-CM

## 2023-09-01 LAB
BACTERIA UR QL AUTO: ABNORMAL /HPF
BILIRUB UR QL STRIP: NEGATIVE
CLARITY UR: CLEAR
COLOR UR: ABNORMAL
GLUCOSE UR STRIP-MCNC: ABNORMAL MG/DL
HGB UR QL STRIP.AUTO: NEGATIVE
KETONES UR STRIP-MCNC: NEGATIVE MG/DL
LEUKOCYTE ESTERASE UR QL STRIP: ABNORMAL
NITRITE UR QL STRIP: NEGATIVE
NON-SQ EPI CELLS URNS QL MICRO: ABNORMAL /HPF
PH UR STRIP.AUTO: 6 [PH]
PROT UR STRIP-MCNC: NEGATIVE MG/DL
RBC #/AREA URNS AUTO: ABNORMAL /HPF
SP GR UR STRIP.AUTO: 1.01 (ref 1–1.03)
UROBILINOGEN UR STRIP-ACNC: <2 MG/DL
WBC #/AREA URNS AUTO: ABNORMAL /HPF

## 2023-09-01 PROCEDURE — 81001 URINALYSIS AUTO W/SCOPE: CPT

## 2023-09-13 ENCOUNTER — VBI (OUTPATIENT)
Dept: ADMINISTRATIVE | Facility: OTHER | Age: 43
End: 2023-09-13

## 2023-09-21 LAB
EXTERNAL HIV CONFIRMATION: NORMAL
EXTERNAL HIV SCREEN: NORMAL

## 2023-10-09 DIAGNOSIS — I95.9 HYPOTENSION: ICD-10-CM

## 2023-10-09 RX ORDER — FLUDROCORTISONE ACETATE 0.1 MG/1
TABLET ORAL
Qty: 60 TABLET | Refills: 0 | Status: SHIPPED | OUTPATIENT
Start: 2023-10-09

## 2023-10-10 DIAGNOSIS — E11.42 TYPE 2 DIABETES MELLITUS WITH DIABETIC POLYNEUROPATHY, WITHOUT LONG-TERM CURRENT USE OF INSULIN (HCC): ICD-10-CM

## 2023-10-10 RX ORDER — INSULIN LISPRO 100 [IU]/ML
INJECTION, SOLUTION INTRAVENOUS; SUBCUTANEOUS
Qty: 15 ML | Refills: 0 | Status: SHIPPED | OUTPATIENT
Start: 2023-10-10

## 2023-10-18 ENCOUNTER — OFFICE VISIT (OUTPATIENT)
Dept: URGENT CARE | Facility: CLINIC | Age: 43
End: 2023-10-18
Payer: COMMERCIAL

## 2023-10-18 VITALS
HEART RATE: 95 BPM | BODY MASS INDEX: 38.09 KG/M2 | TEMPERATURE: 96 F | HEIGHT: 63 IN | DIASTOLIC BLOOD PRESSURE: 80 MMHG | SYSTOLIC BLOOD PRESSURE: 126 MMHG | OXYGEN SATURATION: 96 % | RESPIRATION RATE: 18 BRPM | WEIGHT: 215 LBS

## 2023-10-18 DIAGNOSIS — H65.02 NON-RECURRENT ACUTE SEROUS OTITIS MEDIA OF LEFT EAR: Primary | ICD-10-CM

## 2023-10-18 PROCEDURE — 99213 OFFICE O/P EST LOW 20 MIN: CPT

## 2023-10-18 RX ORDER — AMOXICILLIN AND CLAVULANATE POTASSIUM 875; 125 MG/1; MG/1
1 TABLET, FILM COATED ORAL EVERY 12 HOURS SCHEDULED
Qty: 14 TABLET | Refills: 0 | Status: SHIPPED | OUTPATIENT
Start: 2023-10-18 | End: 2023-10-25

## 2023-10-18 RX ORDER — VENLAFAXINE HYDROCHLORIDE 150 MG/1
300 CAPSULE, EXTENDED RELEASE ORAL DAILY
COMMUNITY

## 2023-10-18 NOTE — PROGRESS NOTES
[facility]        NAME: Artemio Rosario is a 44 y.o. female  : 1980    MRN: [id number]  DATE: 2023  TIME: 4:12 PM    Assessment and Plan   Non-recurrent acute serous otitis media of left ear [H65.02]  1. Non-recurrent acute serous otitis media of left ear  amoxicillin-clavulanate (AUGMENTIN) 875-125 mg per tablet            Patient Instructions       Follow up with PCP in 3-5 days. Proceed to  ER if symptoms worsen. Chief Complaint     Chief Complaint   Patient presents with   • Earache     C/O left ear pain which started 10 days ago then the other ear began hurting 7 days ago and now developed pain and pressure over sinuses 3 days ago. History of Present Illness       C/O left ear pain which started 10 days ago then the other ear began hurting 7 days ago and now developed pain and pressure over sinuses 3 days ago. Earache   Pertinent negatives include no coughing, ear discharge, headaches, hearing loss or sore throat. Review of Systems   Review of Systems   Constitutional:  Negative for appetite change, chills, fatigue and fever. HENT:  Positive for ear pain, sinus pressure and sinus pain. Negative for congestion, ear discharge, hearing loss and sore throat. Respiratory:  Negative for cough, shortness of breath, wheezing and stridor. Cardiovascular:  Negative for chest pain and palpitations. Musculoskeletal:  Negative for myalgias. Neurological:  Negative for dizziness, syncope, light-headedness and headaches.          Current Medications       Current Outpatient Medications:   •  albuterol (Ventolin HFA) 90 mcg/act inhaler, Inhale 2 puffs every 6 (six) hours as needed for wheezing, Disp: 18 g, Rfl: 5  •  amoxicillin-clavulanate (AUGMENTIN) 875-125 mg per tablet, Take 1 tablet by mouth every 12 (twelve) hours for 7 days, Disp: 14 tablet, Rfl: 0  •  aspirin (ECOTRIN LOW STRENGTH) 81 mg EC tablet, Take 1 tablet (81 mg total) by mouth daily, Disp: 30 tablet, Rfl: 5  •  atorvastatin (LIPITOR) 20 mg tablet, Take 1 tablet (20 mg total) by mouth daily with dinner, Disp: 30 tablet, Rfl: 5  •  Blood Glucose Monitoring Suppl (OneTouch Verio Reflect) w/Device KIT, Check blood sugars four times daily. Please substitute with appropriate alternative as covered by patient's insurance. Dx: E11.65, Disp: 1 kit, Rfl: 0  •  FeroSul 325 (65 Fe) MG tablet, take 1 tablet by mouth once daily WITH BREAKFAST, Disp: 30 tablet, Rfl: 5  •  fludrocortisone (FLORINEF) 0.1 mg tablet, take 1 tablet by mouth twice a day, Disp: 60 tablet, Rfl: 0  •  glucose blood test strip, Test 4 times daily as directed., Disp: 100 strip, Rfl: 5  •  Insulin Glargine Solostar (Lantus SoloStar) 100 UNIT/ML SOPN, Inject 0.4 mL (40 Units total) under the skin daily at bedtime, Disp: 15 mL, Rfl: 0  •  insulin lispro (HumaLOG) 100 units/mL injection pen, inject 10 units subcutaneously three times a day with meals, Disp: 15 mL, Rfl: 0  •  Insulin Pen Needle (Pen Needles) 30G X 5 MM MISC, Use 2 (two) times a day, Disp: 100 each, Rfl: 2  •  levothyroxine 25 mcg tablet, Take 1 tablet (25 mcg total) by mouth daily in the early morning, Disp: 30 tablet, Rfl: 0  •  methocarbamol (ROBAXIN) 500 mg tablet, Take 1 tablet (500 mg total) by mouth 3 (three) times a day (Patient taking differently: Take 500 mg by mouth daily as needed), Disp: 90 tablet, Rfl: 1  •  metoprolol succinate (TOPROL-XL) 25 mg 24 hr tablet, take 1 tablet by mouth once daily, Disp: 30 tablet, Rfl: 0  •  OneTouch Delica Lancets 18Q MISC, Check blood sugars four times daily. Please substitute with appropriate alternative as covered by patient's insurance.  Dx: E11.65, Disp: 400 each, Rfl: 3  •  pantoprazole (PROTONIX) 20 mg tablet, Take 1 tablet (20 mg total) by mouth 2 (two) times a day before meals, Disp: 60 tablet, Rfl: 2  •  pregabalin (LYRICA) 75 mg capsule, Take 1 capsule (75 mg total) by mouth 2 (two) times a day, Disp: 60 capsule, Rfl: 2  • topiramate (TOPAMAX) 50 MG tablet, Take 1 tablet (50 mg total) by mouth 2 (two) times a day, Disp: 60 tablet, Rfl: 2  •  traZODone (DESYREL) 100 mg tablet, Take 200 mg by mouth daily at bedtime, Disp: , Rfl:   •  venlafaxine (EFFEXOR-XR) 150 mg 24 hr capsule, Take 300 mg by mouth daily, Disp: , Rfl:   •  diclofenac (VOLTAREN) 75 mg EC tablet, Take 1 tablet (75 mg total) by mouth 2 (two) times a day (Patient taking differently: Take 75 mg by mouth if needed), Disp: 60 tablet, Rfl: 2  •  hydrOXYzine HCL (ATARAX) 25 mg tablet, Take 1 tablet (25 mg total) by mouth 2 (two) times a day (Patient not taking: Reported on 6/30/2023), Disp: 60 tablet, Rfl: 1  •  hydrOXYzine HCL (ATARAX) 50 mg tablet, Take 1 tablet (50 mg total) by mouth daily at bedtime (Patient not taking: Reported on 10/18/2023), Disp: 30 tablet, Rfl: 1  •  lamoTRIgine (LaMICtal) 25 mg tablet, Take 1 tablet (25 mg total) by mouth daily, Disp: 30 tablet, Rfl: 1  •  meloxicam (Mobic) 7.5 mg tablet, Take 1 tablet (7.5 mg total) by mouth daily (Patient not taking: Reported on 10/18/2023), Disp: 10 tablet, Rfl: 0  •  traZODone (DESYREL) 150 mg tablet, Take 1 tablet (150 mg total) by mouth daily at bedtime (Patient not taking: Reported on 6/30/2023), Disp: 30 tablet, Rfl: 1  •  venlafaxine (EFFEXOR-XR) 75 mg 24 hr capsule, Take 3 capsules (225 mg total) by mouth daily, Disp: 90 capsule, Rfl: 1    Current Allergies     Allergies as of 10/18/2023 - Reviewed 10/18/2023   Allergen Reaction Noted   • Orange (diagnostic) - food allergy Hives and Itching 11/03/2021   • Orange fruit [citrus - food allergy] Hives 10/26/2021   • Oxycodone-acetaminophen Itching 03/26/2015            The following portions of the patient's history were reviewed and updated as appropriate: allergies, current medications, past family history, past medical history, past social history, past surgical history and problem list.     Past Medical History:   Diagnosis Date   • Anxiety Resolved:16   • Colon polyp    • Depression 2018   • Diabetes mellitus ([address])    • GERD (gastroesophageal reflux disease)    • Migraine    • Mood disorder (HCC)    • Neuropathy    • Obesity    • Peripheral neuropathy    • PTSD (post-traumatic stress disorder)    • Sacroiliitis ([address]) 10/19/2018   • Sleep difficulties        Past Surgical History:   Procedure Laterality Date   • CARDIAC CATHETERIZATION     •  SECTION     • CHOLECYSTECTOMY     • FOOT SURGERY Right    • TUBAL LIGATION         Family History   Problem Relation Age of Onset   • Heart failure Mother    • Diabetes Mother    • Heart failure Father    • Diabetes Father    • Diabetes Brother          Medications have been verified. Objective   /80   Pulse 95   Temp (!) 96 °F (35.6 °C)   Resp 18   Ht 5' 3" (1.6 m)   Wt 97.5 kg (215 lb)   LMP 10/08/2023 (Approximate)   SpO2 96%   BMI 38.09 kg/m²        Physical Exam     Physical Exam  Vitals and nursing note reviewed. Constitutional:       General: She is not in acute distress. Appearance: Normal appearance. She is normal weight. She is not ill-appearing, toxic-appearing or diaphoretic. HENT:      Head: Normocephalic. Right Ear: No drainage, swelling or tenderness. No middle ear effusion. No PE tube. Tympanic membrane is not perforated, erythematous or bulging. Left Ear: No drainage, swelling or tenderness. No middle ear effusion. No PE tube. Tympanic membrane is erythematous and bulging. Tympanic membrane is not perforated. Neck:      Vascular: No carotid bruit. Cardiovascular:      Rate and Rhythm: Normal rate and regular rhythm. Pulses: Normal pulses. Heart sounds: Normal heart sounds. No murmur heard. No friction rub. No gallop. Pulmonary:      Effort: Pulmonary effort is normal. No respiratory distress. Breath sounds: Normal breath sounds. No stridor. No wheezing, rhonchi or rales. Chest:      Chest wall: No tenderness. Musculoskeletal:      Cervical back: Normal range of motion and neck supple. No rigidity or tenderness. Lymphadenopathy:      Cervical: No cervical adenopathy. Neurological:      Mental Status: She is alert.

## 2023-10-22 DIAGNOSIS — R13.10 DYSPHAGIA: ICD-10-CM

## 2023-10-22 RX ORDER — PANTOPRAZOLE SODIUM 20 MG/1
20 TABLET, DELAYED RELEASE ORAL 2 TIMES DAILY WITH MEALS
Qty: 60 TABLET | Refills: 2 | Status: SHIPPED | OUTPATIENT
Start: 2023-10-22

## 2023-10-26 ENCOUNTER — OFFICE VISIT (OUTPATIENT)
Dept: URGENT CARE | Facility: CLINIC | Age: 43
End: 2023-10-26
Payer: COMMERCIAL

## 2023-10-26 VITALS
DIASTOLIC BLOOD PRESSURE: 78 MMHG | WEIGHT: 223 LBS | TEMPERATURE: 96.6 F | BODY MASS INDEX: 39.51 KG/M2 | SYSTOLIC BLOOD PRESSURE: 108 MMHG | OXYGEN SATURATION: 96 % | HEART RATE: 94 BPM | RESPIRATION RATE: 18 BRPM | HEIGHT: 63 IN

## 2023-10-26 DIAGNOSIS — M25.562 PATELLOFEMORAL ARTHRALGIA OF LEFT KNEE: Primary | ICD-10-CM

## 2023-10-26 PROCEDURE — 99213 OFFICE O/P EST LOW 20 MIN: CPT

## 2023-10-26 NOTE — PROGRESS NOTES
[facility]        NAME: Laura Chun is a 44 y.o. female  : 1980    MRN: [id number]  DATE: 2023  TIME: 8:41 AM    Assessment and Plan   Patellofemoral arthralgia of left knee [M25.562]  1. Patellofemoral arthralgia of left knee              Patient Instructions     Continue diclofenac for pain. Try over the counter Icy Hot cream.    You can apply heating pad for additional relief. Follow-up with your PCP in 3-5 days. Go to the ED for any worsening symptoms. Chief Complaint     Chief Complaint   Patient presents with    Knee Pain     Left knee pain x3 days; no known injury. Patient noticed a shooting pain when getting out of her bed while trying to push self up to a standing position. Taking muscle relaxer and OTC antiinflammatory with some relief at first but no relief now. Pain is worse with motion of flexion and extension; pain starts at patella and travels down anterior left leg         History of Present Illness       45-year-old female who presents with left knee pain x3 days. Patient denies known injury or incident that caused this. Reports a sharp pain behind her kneecap that occasionally shoots down the front of her leg. Notes hx neuropathy due to T2DM. No weakness. Tried Robaxin and diclofenac that were previously prescribed to her which provide mild relief. Review of Systems   Review of Systems   Constitutional:  Negative for chills and fever. Respiratory:  Negative for chest tightness and shortness of breath. Cardiovascular:  Negative for chest pain and palpitations. Musculoskeletal:  Positive for arthralgias (Left knee). Negative for joint swelling and myalgias. Skin:  Negative for rash and wound. Neurological:  Negative for dizziness, light-headedness and headaches.          Current Medications       Current Outpatient Medications:     albuterol (Ventolin HFA) 90 mcg/act inhaler, Inhale 2 puffs every 6 (six) hours as needed for wheezing, Disp: 18 g, Rfl: 5    aspirin (ECOTRIN LOW STRENGTH) 81 mg EC tablet, Take 1 tablet (81 mg total) by mouth daily, Disp: 30 tablet, Rfl: 5    atorvastatin (LIPITOR) 20 mg tablet, Take 1 tablet (20 mg total) by mouth daily with dinner, Disp: 30 tablet, Rfl: 5    Blood Glucose Monitoring Suppl (OneTouch Verio Reflect) w/Device KIT, Check blood sugars four times daily. Please substitute with appropriate alternative as covered by patient's insurance. Dx: E11.65, Disp: 1 kit, Rfl: 0    diclofenac (VOLTAREN) 75 mg EC tablet, Take 1 tablet (75 mg total) by mouth 2 (two) times a day (Patient taking differently: Take 75 mg by mouth if needed), Disp: 60 tablet, Rfl: 2    FeroSul 325 (65 Fe) MG tablet, take 1 tablet by mouth once daily WITH BREAKFAST, Disp: 30 tablet, Rfl: 5    fludrocortisone (FLORINEF) 0.1 mg tablet, take 1 tablet by mouth twice a day, Disp: 60 tablet, Rfl: 0    glucose blood test strip, Test 4 times daily as directed., Disp: 100 strip, Rfl: 5    Insulin Glargine Solostar (Lantus SoloStar) 100 UNIT/ML SOPN, Inject 0.4 mL (40 Units total) under the skin daily at bedtime, Disp: 15 mL, Rfl: 0    insulin lispro (HumaLOG) 100 units/mL injection pen, inject 10 units subcutaneously three times a day with meals, Disp: 15 mL, Rfl: 0    Insulin Pen Needle (Pen Needles) 30G X 5 MM MISC, Use 2 (two) times a day, Disp: 100 each, Rfl: 2    levothyroxine 25 mcg tablet, Take 1 tablet (25 mcg total) by mouth daily in the early morning, Disp: 30 tablet, Rfl: 0    methocarbamol (ROBAXIN) 500 mg tablet, Take 1 tablet (500 mg total) by mouth 3 (three) times a day (Patient taking differently: Take 500 mg by mouth daily as needed), Disp: 90 tablet, Rfl: 1    metoprolol succinate (TOPROL-XL) 25 mg 24 hr tablet, take 1 tablet by mouth once daily, Disp: 30 tablet, Rfl: 0    OneTouch Delica Lancets 92Y MISC, Check blood sugars four times daily. Please substitute with appropriate alternative as covered by patient's insurance. Dx:  E11.65, Disp: 400 each, Rfl: 3    pantoprazole (PROTONIX) 20 mg tablet, TAKE 1 TABLET BY MOUTH 2 TIMES DAILY BEFORE MEALS, Disp: 60 tablet, Rfl: 2    pregabalin (LYRICA) 75 mg capsule, Take 1 capsule (75 mg total) by mouth 2 (two) times a day, Disp: 60 capsule, Rfl: 2    topiramate (TOPAMAX) 50 MG tablet, Take 1 tablet (50 mg total) by mouth 2 (two) times a day, Disp: 60 tablet, Rfl: 2    traZODone (DESYREL) 100 mg tablet, Take 200 mg by mouth daily at bedtime, Disp: , Rfl:     venlafaxine (EFFEXOR-XR) 150 mg 24 hr capsule, Take 300 mg by mouth daily, Disp: , Rfl:     venlafaxine (EFFEXOR-XR) 75 mg 24 hr capsule, Take 3 capsules (225 mg total) by mouth daily, Disp: 90 capsule, Rfl: 1    hydrOXYzine HCL (ATARAX) 25 mg tablet, Take 1 tablet (25 mg total) by mouth 2 (two) times a day (Patient not taking: Reported on 6/30/2023), Disp: 60 tablet, Rfl: 1    hydrOXYzine HCL (ATARAX) 50 mg tablet, Take 1 tablet (50 mg total) by mouth daily at bedtime (Patient not taking: Reported on 10/18/2023), Disp: 30 tablet, Rfl: 1    lamoTRIgine (LaMICtal) 25 mg tablet, Take 1 tablet (25 mg total) by mouth daily, Disp: 30 tablet, Rfl: 1    meloxicam (Mobic) 7.5 mg tablet, Take 1 tablet (7.5 mg total) by mouth daily (Patient not taking: Reported on 10/18/2023), Disp: 10 tablet, Rfl: 0    traZODone (DESYREL) 150 mg tablet, Take 1 tablet (150 mg total) by mouth daily at bedtime (Patient not taking: Reported on 6/30/2023), Disp: 30 tablet, Rfl: 1    Current Allergies     Allergies as of 10/26/2023 - Reviewed 10/26/2023   Allergen Reaction Noted    Orange (diagnostic) - food allergy Hives and Itching 11/03/2021    Orange fruit [citrus - food allergy] Hives 10/26/2021    Oxycodone-acetaminophen Itching 03/26/2015            The following portions of the patient's history were reviewed and updated as appropriate: allergies, current medications, past family history, past medical history, past social history, past surgical history and problem list.     Past Medical History:   Diagnosis Date    Anxiety     Resolved:16    Colon polyp     Depression 2018    Diabetes mellitus (HCC)     GERD (gastroesophageal reflux disease)     Migraine     Mood disorder (HCC)     Neuropathy     Obesity     Peripheral neuropathy     PTSD (post-traumatic stress disorder)     Sacroiliitis ([address]) 10/19/2018    Sleep difficulties        Past Surgical History:   Procedure Laterality Date    CARDIAC CATHETERIZATION       SECTION      CHOLECYSTECTOMY      FOOT SURGERY Right     TUBAL LIGATION         Family History   Problem Relation Age of Onset    Heart failure Mother     Diabetes Mother     Heart failure Father     Diabetes Father     Diabetes Brother          Medications have been verified. Objective   /78   Pulse 94   Temp (!) 96.6 °F (35.9 °C)   Resp 18   Ht 5' 3" (1.6 m)   Wt 101 kg (223 lb)   LMP 10/08/2023 (Approximate)   SpO2 96%   BMI 39.50 kg/m²        Physical Exam     Physical Exam  Vitals and nursing note reviewed. Constitutional:       General: She is not in acute distress. Appearance: She is not ill-appearing or diaphoretic. HENT:      Head: Normocephalic. Mouth/Throat:      Mouth: Mucous membranes are moist.   Cardiovascular:      Rate and Rhythm: Normal rate and regular rhythm. Pulses: Normal pulses. Heart sounds: Normal heart sounds. Pulmonary:      Effort: Pulmonary effort is normal.      Breath sounds: Normal breath sounds. Musculoskeletal:      Cervical back: Normal range of motion and neck supple. Left knee: No swelling, effusion, erythema, ecchymosis or crepitus. Decreased range of motion. Tenderness present. Instability Tests: Anterior drawer test negative. Medial Mc test negative and lateral Mc test negative. Left lower leg: Normal.      Comments: Pain with full flexion / extension. Skin:     General: Skin is warm and dry.       Capillary Refill: Capillary refill takes less than 2 seconds. Neurological:      Mental Status: She is alert and oriented to person, place, and time.       Gait: Gait normal.

## 2023-10-26 NOTE — PATIENT INSTRUCTIONS
Continue diclofenac for pain. Try over the counter Icy Hot cream.    You can apply heating pad for additional relief. Follow-up with your PCP in 3-5 days. Go to the ED for any worsening symptoms.

## 2023-11-07 DIAGNOSIS — I95.9 HYPOTENSION: ICD-10-CM

## 2023-11-07 RX ORDER — FLUDROCORTISONE ACETATE 0.1 MG/1
TABLET ORAL
Qty: 60 TABLET | Refills: 0 | Status: SHIPPED | OUTPATIENT
Start: 2023-11-07

## 2023-11-20 DIAGNOSIS — R00.2 HEART PALPITATIONS: ICD-10-CM

## 2023-11-20 DIAGNOSIS — M79.18 MYOFASCIAL PAIN SYNDROME: ICD-10-CM

## 2023-11-20 RX ORDER — METOPROLOL SUCCINATE 25 MG/1
25 TABLET, EXTENDED RELEASE ORAL DAILY
Qty: 30 TABLET | Refills: 0 | Status: SHIPPED | OUTPATIENT
Start: 2023-11-20

## 2023-11-20 RX ORDER — PREGABALIN 75 MG/1
75 CAPSULE ORAL 2 TIMES DAILY
Qty: 60 CAPSULE | Refills: 2 | Status: SHIPPED | OUTPATIENT
Start: 2023-11-20

## 2023-12-13 DIAGNOSIS — I95.9 HYPOTENSION: ICD-10-CM

## 2023-12-13 RX ORDER — FLUDROCORTISONE ACETATE 0.1 MG/1
TABLET ORAL
Qty: 60 TABLET | Refills: 0 | Status: SHIPPED | OUTPATIENT
Start: 2023-12-13

## 2023-12-14 DIAGNOSIS — R00.2 HEART PALPITATIONS: ICD-10-CM

## 2023-12-14 RX ORDER — METOPROLOL SUCCINATE 25 MG/1
25 TABLET, EXTENDED RELEASE ORAL DAILY
Qty: 30 TABLET | Refills: 0 | Status: SHIPPED | OUTPATIENT
Start: 2023-12-14

## 2023-12-15 ENCOUNTER — OFFICE VISIT (OUTPATIENT)
Dept: URGENT CARE | Facility: CLINIC | Age: 43
End: 2023-12-15
Payer: COMMERCIAL

## 2023-12-15 VITALS
HEIGHT: 63 IN | RESPIRATION RATE: 18 BRPM | BODY MASS INDEX: 39.16 KG/M2 | TEMPERATURE: 97.9 F | WEIGHT: 221 LBS | OXYGEN SATURATION: 96 % | DIASTOLIC BLOOD PRESSURE: 74 MMHG | SYSTOLIC BLOOD PRESSURE: 118 MMHG | HEART RATE: 90 BPM

## 2023-12-15 DIAGNOSIS — J32.9 SINOBRONCHITIS: Primary | ICD-10-CM

## 2023-12-15 DIAGNOSIS — J40 SINOBRONCHITIS: Primary | ICD-10-CM

## 2023-12-15 PROCEDURE — 99213 OFFICE O/P EST LOW 20 MIN: CPT

## 2023-12-15 RX ORDER — DIVALPROEX SODIUM 250 MG/1
750 TABLET, EXTENDED RELEASE ORAL DAILY
COMMUNITY

## 2023-12-15 RX ORDER — ALBUTEROL SULFATE 90 UG/1
2 AEROSOL, METERED RESPIRATORY (INHALATION) EVERY 6 HOURS PRN
Qty: 8.5 G | Refills: 0 | Status: SHIPPED | OUTPATIENT
Start: 2023-12-15

## 2023-12-15 RX ORDER — AZITHROMYCIN 250 MG/1
TABLET, FILM COATED ORAL
Qty: 6 TABLET | Refills: 0 | Status: SHIPPED | OUTPATIENT
Start: 2023-12-15 | End: 2023-12-19

## 2023-12-15 RX ORDER — BENZONATATE 200 MG/1
200 CAPSULE ORAL 3 TIMES DAILY PRN
Qty: 20 CAPSULE | Refills: 0 | Status: SHIPPED | OUTPATIENT
Start: 2023-12-15

## 2023-12-15 NOTE — PROGRESS NOTES
[facility]        NAME: Agnes Palomares is a 44 y.o. female  : 1980    MRN: [id number]  DATE: December 15, 2023  TIME: 3:48 PM    Assessment and Plan   Sinobronchitis [J32.9, J40]  1. Sinobronchitis  azithromycin (ZITHROMAX) 250 mg tablet    albuterol (ProAir HFA) 90 mcg/act inhaler    benzonatate (TESSALON) 200 MG capsule            Patient Instructions     Take antibiotics as prescribed  Take Tessalon as needed for cough  Use albuterol inhaler as needed  Tylenol/ibuprofen for pain or fever   Honey for cough  Cool mist humidifier or steamy shower using safe precautions to prevent burns   Follow up with PCP in 3-5 days. Proceed to  ER if symptoms worsen. Chief Complaint     Chief Complaint   Patient presents with    Cold Like Symptoms     Symptoms X 2 weeks. . Not getting any better. Coughing and congestion. . Did not covid test at all         History of Present Illness       URI   This is a new problem. Episode onset: 2 weeks. Associated symptoms include congestion, coughing (slightly productive) and rhinorrhea. Pertinent negatives include no chest pain, ear pain, sore throat or wheezing. Treatments tried: Mucinex. Review of Systems   Review of Systems   Constitutional:  Negative for chills, diaphoresis, fatigue and fever. HENT:  Positive for congestion, rhinorrhea and sinus pressure. Negative for ear pain, postnasal drip, sore throat and trouble swallowing. Respiratory:  Positive for cough (slightly productive). Negative for chest tightness, shortness of breath and wheezing. Cardiovascular:  Negative for chest pain and palpitations. Skin:  Negative for color change. Neurological:  Negative for dizziness and light-headedness. Psychiatric/Behavioral:  Negative for sleep disturbance.           Current Medications       Current Outpatient Medications:     albuterol (ProAir HFA) 90 mcg/act inhaler, Inhale 2 puffs every 6 (six) hours as needed for wheezing, Disp: 8.5 g, Rfl: 0 azithromycin (ZITHROMAX) 250 mg tablet, Take 2 tablets today then 1 tablet daily x 4 days, Disp: 6 tablet, Rfl: 0    benzonatate (TESSALON) 200 MG capsule, Take 1 capsule (200 mg total) by mouth 3 (three) times a day as needed for cough, Disp: 20 capsule, Rfl: 0    Blood Glucose Monitoring Suppl (OneTouch Verio Reflect) w/Device KIT, Check blood sugars four times daily. Please substitute with appropriate alternative as covered by patient's insurance. Dx: E11.65, Disp: 1 kit, Rfl: 0    diclofenac (VOLTAREN) 75 mg EC tablet, Take 1 tablet (75 mg total) by mouth 2 (two) times a day (Patient taking differently: Take 75 mg by mouth if needed), Disp: 60 tablet, Rfl: 2    divalproex sodium (DEPAKOTE ER) 250 mg 24 hr tablet, Take 750 mg by mouth daily, Disp: , Rfl:     FeroSul 325 (65 Fe) MG tablet, take 1 tablet by mouth once daily WITH BREAKFAST, Disp: 30 tablet, Rfl: 5    fludrocortisone (FLORINEF) 0.1 mg tablet, take 1 tablet by mouth twice a day, Disp: 60 tablet, Rfl: 0    glucose blood test strip, Test 4 times daily as directed., Disp: 100 strip, Rfl: 5    Insulin Glargine Solostar (Lantus SoloStar) 100 UNIT/ML SOPN, Inject 0.4 mL (40 Units total) under the skin daily at bedtime, Disp: 15 mL, Rfl: 0    insulin lispro (HumaLOG) 100 units/mL injection pen, inject 10 units subcutaneously three times a day with meals, Disp: 15 mL, Rfl: 0    Insulin Pen Needle (Pen Needles) 30G X 5 MM MISC, Use 2 (two) times a day, Disp: 100 each, Rfl: 2    methocarbamol (ROBAXIN) 500 mg tablet, Take 1 tablet (500 mg total) by mouth 3 (three) times a day (Patient taking differently: Take 500 mg by mouth daily as needed), Disp: 90 tablet, Rfl: 1    metoprolol succinate (TOPROL-XL) 25 mg 24 hr tablet, take 1 tablet by mouth once daily, Disp: 30 tablet, Rfl: 0    OneTouch Delica Lancets 16Q MISC, Check blood sugars four times daily. Please substitute with appropriate alternative as covered by patient's insurance.  Dx: E11.65, Disp: 400 each, Rfl: 3    pantoprazole (PROTONIX) 20 mg tablet, TAKE 1 TABLET BY MOUTH 2 TIMES DAILY BEFORE MEALS, Disp: 60 tablet, Rfl: 2    pregabalin (LYRICA) 75 mg capsule, Take 1 capsule (75 mg total) by mouth 2 (two) times a day, Disp: 60 capsule, Rfl: 2    traZODone (DESYREL) 100 mg tablet, Take 200 mg by mouth daily at bedtime, Disp: , Rfl:     venlafaxine (EFFEXOR-XR) 150 mg 24 hr capsule, Take 300 mg by mouth daily, Disp: , Rfl:     aspirin (ECOTRIN LOW STRENGTH) 81 mg EC tablet, Take 1 tablet (81 mg total) by mouth daily, Disp: 30 tablet, Rfl: 5    atorvastatin (LIPITOR) 20 mg tablet, Take 1 tablet (20 mg total) by mouth daily with dinner, Disp: 30 tablet, Rfl: 5    hydrOXYzine HCL (ATARAX) 25 mg tablet, Take 1 tablet (25 mg total) by mouth 2 (two) times a day (Patient not taking: Reported on 6/30/2023), Disp: 60 tablet, Rfl: 1    hydrOXYzine HCL (ATARAX) 50 mg tablet, Take 1 tablet (50 mg total) by mouth daily at bedtime, Disp: 30 tablet, Rfl: 1    lamoTRIgine (LaMICtal) 25 mg tablet, Take 1 tablet (25 mg total) by mouth daily, Disp: 30 tablet, Rfl: 1    levothyroxine 25 mcg tablet, Take 1 tablet (25 mcg total) by mouth daily in the early morning, Disp: 30 tablet, Rfl: 0    meloxicam (Mobic) 7.5 mg tablet, Take 1 tablet (7.5 mg total) by mouth daily, Disp: 10 tablet, Rfl: 0    topiramate (TOPAMAX) 50 MG tablet, Take 1 tablet (50 mg total) by mouth 2 (two) times a day, Disp: 60 tablet, Rfl: 2    traZODone (DESYREL) 150 mg tablet, Take 1 tablet (150 mg total) by mouth daily at bedtime (Patient not taking: Reported on 6/30/2023), Disp: 30 tablet, Rfl: 1    venlafaxine (EFFEXOR-XR) 75 mg 24 hr capsule, Take 3 capsules (225 mg total) by mouth daily, Disp: 90 capsule, Rfl: 1    Current Allergies     Allergies as of 12/15/2023 - Reviewed 12/15/2023   Allergen Reaction Noted    Orange (diagnostic) - food allergy Hives and Itching 11/03/2021    Orange fruit [citrus - food allergy] Hives 10/26/2021    Oxycodone-acetaminophen Itching 2015            The following portions of the patient's history were reviewed and updated as appropriate: allergies, current medications, past family history, past medical history, past social history, past surgical history and problem list.     Past Medical History:   Diagnosis Date    Anxiety     Resolved:16    Colon polyp     Depression 2018    Diabetes mellitus (HCC)     GERD (gastroesophageal reflux disease)     Migraine     Mood disorder (HCC)     Neuropathy     Obesity     Peripheral neuropathy     PTSD (post-traumatic stress disorder)     Sacroiliitis ([address]) 10/19/2018    Sleep difficulties        Past Surgical History:   Procedure Laterality Date    CARDIAC CATHETERIZATION       SECTION      CHOLECYSTECTOMY      FOOT SURGERY Right     TUBAL LIGATION         Family History   Problem Relation Age of Onset    Heart failure Mother     Diabetes Mother     Heart failure Father     Diabetes Father     Diabetes Brother          Medications have been verified. Objective   /74   Pulse 90   Temp 97.9 °F (36.6 °C)   Resp 18   Ht 5' 3" (1.6 m)   Wt 100 kg (221 lb)   LMP 11/15/2023   SpO2 96%   BMI 39.15 kg/m²        Physical Exam     Physical Exam  Constitutional:       General: She is not in acute distress. Appearance: Normal appearance. She is not ill-appearing. HENT:      Head: Normocephalic. Right Ear: Tympanic membrane and external ear normal.      Left Ear: Tympanic membrane and external ear normal.      Nose: No congestion. Right Sinus: Maxillary sinus tenderness present. Left Sinus: Maxillary sinus tenderness present. Mouth/Throat:      Mouth: Mucous membranes are moist.      Pharynx: Oropharynx is clear. Posterior oropharyngeal erythema present. Comments: Post nasal drip noted  Cardiovascular:      Rate and Rhythm: Normal rate and regular rhythm. Pulses: Normal pulses. Heart sounds: Normal heart sounds.    Pulmonary: Effort: Pulmonary effort is normal. No respiratory distress. Breath sounds: Normal breath sounds. No stridor. No wheezing, rhonchi or rales. Lymphadenopathy:      Cervical: No cervical adenopathy. Skin:     General: Skin is warm and dry. Neurological:      General: No focal deficit present. Mental Status: She is alert and oriented to person, place, and time. Mental status is at baseline. Psychiatric:         Mood and Affect: Mood normal.         Behavior: Behavior normal.         Thought Content:  Thought content normal.         Judgment: Judgment normal.

## 2023-12-15 NOTE — PATIENT INSTRUCTIONS
Take antibiotics as prescribed  Take Tessalon as needed for cough  Use albuterol inhaler as needed  Tylenol/ibuprofen for pain or fever   Honey for cough  Cool mist humidifier or steamy shower using safe precautions to prevent burns   Follow up with PCP in 3-5 days. Proceed to  ER if symptoms worsen.

## 2024-01-08 DIAGNOSIS — F33.9 RECURRENT MAJOR DEPRESSIVE DISORDER (HCC): ICD-10-CM

## 2024-01-08 DIAGNOSIS — I95.9 HYPOTENSION: ICD-10-CM

## 2024-01-08 DIAGNOSIS — R51.9 NONINTRACTABLE HEADACHE, UNSPECIFIED CHRONICITY PATTERN, UNSPECIFIED HEADACHE TYPE: ICD-10-CM

## 2024-01-08 DIAGNOSIS — M79.18 MYOFASCIAL PAIN SYNDROME: ICD-10-CM

## 2024-01-08 RX ORDER — TOPIRAMATE 50 MG/1
50 TABLET, FILM COATED ORAL 2 TIMES DAILY
Qty: 60 TABLET | Refills: 2 | Status: SHIPPED | OUTPATIENT
Start: 2024-01-08 | End: 2024-04-07

## 2024-01-08 RX ORDER — FLUDROCORTISONE ACETATE 0.1 MG/1
TABLET ORAL
Qty: 60 TABLET | Refills: 0 | Status: SHIPPED | OUTPATIENT
Start: 2024-01-08

## 2024-01-08 RX ORDER — PREGABALIN 75 MG/1
75 CAPSULE ORAL 2 TIMES DAILY
Qty: 60 CAPSULE | Refills: 2 | Status: SHIPPED | OUTPATIENT
Start: 2024-01-08

## 2024-01-18 ENCOUNTER — TELEPHONE (OUTPATIENT)
Age: 44
End: 2024-01-18

## 2024-01-19 ENCOUNTER — TELEPHONE (OUTPATIENT)
Dept: ADMINISTRATIVE | Facility: OTHER | Age: 44
End: 2024-01-19

## 2024-01-19 NOTE — TELEPHONE ENCOUNTER
Upon review of the In Basket request we were able to locate, review, and update the patient chart as requested for HIV.    Any additional questions or concerns should be emailed to the Practice Liaisons via the appropriate education email address, please do not reply via In Basket.    Thank you  Rasheeda Noyola

## 2024-01-22 ENCOUNTER — OFFICE VISIT (OUTPATIENT)
Dept: FAMILY MEDICINE CLINIC | Facility: CLINIC | Age: 44
End: 2024-01-22
Payer: COMMERCIAL

## 2024-01-22 VITALS
HEART RATE: 103 BPM | OXYGEN SATURATION: 97 % | TEMPERATURE: 97.3 F | SYSTOLIC BLOOD PRESSURE: 130 MMHG | WEIGHT: 215.39 LBS | BODY MASS INDEX: 38.16 KG/M2 | HEIGHT: 63 IN | DIASTOLIC BLOOD PRESSURE: 76 MMHG

## 2024-01-22 DIAGNOSIS — K21.9 GASTROESOPHAGEAL REFLUX DISEASE WITHOUT ESOPHAGITIS: ICD-10-CM

## 2024-01-22 DIAGNOSIS — K58.1 IRRITABLE BOWEL SYNDROME WITH CONSTIPATION: ICD-10-CM

## 2024-01-22 DIAGNOSIS — G43.009 MIGRAINE WITHOUT AURA AND WITHOUT STATUS MIGRAINOSUS, NOT INTRACTABLE: ICD-10-CM

## 2024-01-22 DIAGNOSIS — I25.10 CAD (CORONARY ARTERY DISEASE): ICD-10-CM

## 2024-01-22 DIAGNOSIS — G89.29 CHRONIC BILATERAL LOW BACK PAIN WITHOUT SCIATICA: ICD-10-CM

## 2024-01-22 DIAGNOSIS — E03.9 HYPOTHYROIDISM, UNSPECIFIED TYPE: ICD-10-CM

## 2024-01-22 DIAGNOSIS — F43.10 PTSD (POST-TRAUMATIC STRESS DISORDER): ICD-10-CM

## 2024-01-22 DIAGNOSIS — M79.18 MYOFASCIAL PAIN SYNDROME: ICD-10-CM

## 2024-01-22 DIAGNOSIS — E11.42 TYPE 2 DIABETES MELLITUS WITH DIABETIC POLYNEUROPATHY, WITHOUT LONG-TERM CURRENT USE OF INSULIN (HCC): Primary | ICD-10-CM

## 2024-01-22 DIAGNOSIS — D12.6 ADENOMATOUS POLYP OF COLON, UNSPECIFIED PART OF COLON: ICD-10-CM

## 2024-01-22 DIAGNOSIS — J45.20 MILD INTERMITTENT ASTHMA WITHOUT COMPLICATION: ICD-10-CM

## 2024-01-22 DIAGNOSIS — R13.10 DYSPHAGIA, UNSPECIFIED TYPE: ICD-10-CM

## 2024-01-22 DIAGNOSIS — M25.572 CHRONIC PAIN OF LEFT ANKLE: ICD-10-CM

## 2024-01-22 DIAGNOSIS — D50.9 IRON DEFICIENCY ANEMIA, UNSPECIFIED IRON DEFICIENCY ANEMIA TYPE: ICD-10-CM

## 2024-01-22 DIAGNOSIS — G89.29 CHRONIC PAIN OF LEFT ANKLE: ICD-10-CM

## 2024-01-22 DIAGNOSIS — F33.2 MAJOR DEPRESSIVE DISORDER, RECURRENT SEVERE WITHOUT PSYCHOTIC FEATURES (HCC): ICD-10-CM

## 2024-01-22 DIAGNOSIS — E78.2 MIXED HYPERLIPIDEMIA: ICD-10-CM

## 2024-01-22 DIAGNOSIS — I95.1 ORTHOSTATIC HYPOTENSION: ICD-10-CM

## 2024-01-22 DIAGNOSIS — M54.50 CHRONIC BILATERAL LOW BACK PAIN WITHOUT SCIATICA: ICD-10-CM

## 2024-01-22 PROBLEM — J30.9 ALLERGIC RHINITIS: Status: ACTIVE | Noted: 2023-09-14

## 2024-01-22 PROBLEM — G43.809 MIGRAINE VARIANT WITHOUT INTRACTABILITY: Status: ACTIVE | Noted: 2023-09-14

## 2024-01-22 PROBLEM — R00.2 HEART PALPITATIONS: Status: RESOLVED | Noted: 2021-05-05 | Resolved: 2024-01-22

## 2024-01-22 PROBLEM — M25.531 RIGHT WRIST PAIN: Status: RESOLVED | Noted: 2022-11-08 | Resolved: 2024-01-22

## 2024-01-22 PROBLEM — F33.9 RECURRENT MAJOR DEPRESSIVE DISORDER (HCC): Status: RESOLVED | Noted: 2023-02-22 | Resolved: 2024-01-22

## 2024-01-22 PROBLEM — I95.9 HYPOTENSION: Status: RESOLVED | Noted: 2020-07-10 | Resolved: 2024-01-22

## 2024-01-22 PROBLEM — G43.809 MIGRAINE VARIANT WITHOUT INTRACTABILITY: Status: RESOLVED | Noted: 2023-09-14 | Resolved: 2024-01-22

## 2024-01-22 PROCEDURE — 99214 OFFICE O/P EST MOD 30 MIN: CPT | Performed by: FAMILY MEDICINE

## 2024-01-22 RX ORDER — ATORVASTATIN CALCIUM 20 MG/1
20 TABLET, FILM COATED ORAL
Qty: 90 TABLET | Refills: 1 | Status: SHIPPED | OUTPATIENT
Start: 2024-01-22 | End: 2024-07-20

## 2024-01-22 RX ORDER — VENLAFAXINE HYDROCHLORIDE 150 MG/1
150 CAPSULE, EXTENDED RELEASE ORAL DAILY
Start: 2024-01-22

## 2024-01-22 RX ORDER — VENLAFAXINE HYDROCHLORIDE 75 MG/1
75 CAPSULE, EXTENDED RELEASE ORAL DAILY
Start: 2024-01-22

## 2024-01-22 RX ORDER — PREGABALIN 150 MG/1
150 CAPSULE ORAL 2 TIMES DAILY
Qty: 180 CAPSULE | Refills: 1 | Status: SHIPPED | OUTPATIENT
Start: 2024-01-22

## 2024-01-22 RX ORDER — ARIPIPRAZOLE 5 MG/1
5 TABLET ORAL DAILY
COMMUNITY
Start: 2024-01-13

## 2024-01-22 NOTE — PROGRESS NOTES
Name: Rebecca Shaw      : 1980      MRN: [id number]  Encounter Provider: Genevieve Colmenares DO  Encounter Date: 2024   Encounter department: [facility]    Assessment & Plan     1. Type 2 diabetes mellitus with diabetic polyneuropathy, without long-term current use of insulin ([facility]ly [facility])  Assessment & Plan:    Lab Results   Component Value Date    HGBA1C 8.9 (H) 09/15/2023     Continue Insulin: 40u HS, mealtime 10u +SSI  Continue Lyrica 150mg BID for polyneuropathy and pain  Patient overdue for follow up with her podiatrist - encouraged her to schedule this  Due for Eye exam soon, patient to find new one locally and call if having trouble or needs a referral  Discussed the importance of sticking to a diabetic diet, however patient refuses to do this.  She declines nutrition referral or diabetic education referral.  On Statin  Due for labs  F/u 6 weeks    Orders:  -     Hemoglobin A1C; Future; Expected date: 2024  -     Comprehensive metabolic panel; Future; Expected date: 2024  -     CBC and differential; Future; Expected date: 2024  -     Albumin / creatinine urine ratio; Future; Expected date: 2024  -     Lipid Panel with Direct LDL reflex; Future; Expected date: 2024  -     TSH, 3rd generation with Free T4 reflex; Future; Expected date: 2024    2. CAD (coronary artery disease)  Assessment & Plan:  Chronic, stable  No acute symptoms reported  Continue statin, beta-blocker, ASA    Orders:  -     aspirin (ECOTRIN LOW STRENGTH) 81 mg EC tablet; Take 1 tablet (81 mg total) by mouth daily  -     atorvastatin (LIPITOR) 20 mg tablet; Take 1 tablet (20 mg total) by mouth daily with dinner    3. Myofascial pain syndrome  Assessment & Plan:  Continue Lyrica.  Will resume patient at previous dose of 150 twice a day.  Continue to monitor, and consider referral to spine/pain management at follow-up.    Orders:  -     pregabalin (LYRICA) 150 mg  capsule; Take 1 capsule (150 mg total) by mouth 2 (two) times a day    4. Major depressive disorder, recurrent severe without psychotic features (HCC)  Assessment & Plan:  Patient follows with psychiatry and therapist on a regular basis, and they manage her medications  Continue Abilify, Depakote, trazodone, Effexor    Orders:  -     venlafaxine (EFFEXOR-XR) 150 mg 24 hr capsule; Take 1 capsule (150 mg total) by mouth daily  -     venlafaxine (EFFEXOR-XR) 75 mg 24 hr capsule; Take 1 capsule (75 mg total) by mouth daily    5. Adenomatous polyp of colon, unspecified part of colon  Assessment & Plan:  Had colonoscopy in 2020 for monitoring of colon polyps. Prep was inadequate and she was to repeat scope a year later but has not done so yet. She moved to this area and needs a referral.     Orders:  -     Ambulatory Referral to Gastroenterology; Future    6. Hypothyroidism, unspecified type  Assessment & Plan:  Needs updated labs  Continue Levothyroxine 25mcg daily    Orders:  -     TSH, 3rd generation with Free T4 reflex; Future; Expected date: 01/22/2024    7. Gastroesophageal reflux disease without esophagitis  Assessment & Plan:  Needs to re-est with GI  Continue PPI    Orders:  -     Ambulatory Referral to Gastroenterology; Future    8. Irritable bowel syndrome with constipation  Assessment & Plan:  Needs to re-est with GI  Colonoscopy unsuccessful 2020, overdue for repeat    Orders:  -     Ambulatory Referral to Gastroenterology; Future    9. Dysphagia, unspecified type  Assessment & Plan:  EGD 6/2020 Biopsies and brushings were negative.   Had esophageal manometry 7/22/21: Acid exposure time is 17.3%; DeMeester score is 66; 50 episodes of reflux with 48 in upright position and had to in recumbent position  All episodes were acid reflux episode  She has not followed up with GI and needs a new referral now that she moved to this area from [place].   Continue Protonix 20mg BID    Orders:  -     Ambulatory Referral  to Gastroenterology; Future    10. Mild intermittent asthma without complication  Assessment & Plan:  Chronic, stable  Continue as needed albuterol  Advise vaping cessation      11. Migraine without aura and without status migrainosus, not intractable  Assessment & Plan:  Chronic, stable  Continue Topamax      12. Orthostatic hypotension  Assessment & Plan:  Patient is on Florinef due to history of hypotension, continue      13. Chronic bilateral low back pain without sciatica  Assessment & Plan:  Continue Lyrica 150 mg twice daily  Consider referral to spine/pain management at follow-up      14. Chronic pain of left ankle  Assessment & Plan:  Continue diclofenac tab 75 mg twice daily as needed  Patient overdue for follow-up with podiatry, encouraged her to call and schedule this.      15. Mixed hyperlipidemia  Assessment & Plan:  No recent lipid panel available, will repeat labs at this time  Continue atorvastatin      16. PTSD (post-traumatic stress disorder)  Assessment & Plan:  Patient follows closely with psychiatry and therapist  Continue current medications      17. Iron deficiency anemia, unspecified iron deficiency anemia type  Assessment & Plan:  No recent labs available for review.  Will check CBC, and iron panel  Continue daily iron tab    Orders:  -     Iron Panel (Includes Ferritin, Iron Sat%, Iron, and TIBC); Future        Return in about 6 weeks (around 3/4/2024) for Annual physical.      Subjective      HPI  CC: est care and review chronic health conditions    Patient used to see HAILEE Hart with [facility] in [place]. She recently moved from [place] to [place], and was homeless for a period of time in between. She last saw Fatuma April Last year.     10u with meals, 40u at bedtime, plus SSI   QID accuchecks     PMH: Allergic rhinitis, Asthma, MDD,   SurgHx; Right foot surgery, Csec x4   Allergies: Oranges, and Oxycodone-acetaminophen  Medications: Reviewed in chart and up to date    FamHx: Mother - T2DM, CHF, CKD; Father - CHF, tongue cancer, T2DM   SocialHx:   Tobacco: vape nicotine    No drug use   Alcohol: occasional, special occasions only    Relationship:  for 2 years, still ; has 4 children (ages 23, 21, 19, 15)   Career: Disabled - not yet permanent   Specialist: psychiatry and CBT therapist, endocrinology, orthopedics, podiatry     She refuses nutrition    Colonoscopy over due 2021    Over due pap       Review of Systems   Constitutional:  Negative for appetite change, chills, diaphoresis, fever and unexpected weight change.   Eyes:  Negative for visual disturbance.   Respiratory:  Negative for shortness of breath.    Cardiovascular:  Negative for chest pain and leg swelling.   Gastrointestinal:  Negative for constipation and diarrhea.   Endocrine: Negative for polydipsia and polyuria.   Genitourinary:  Negative for frequency.   Musculoskeletal:  Positive for arthralgias.   Neurological:  Negative for dizziness, light-headedness and headaches.       Current Outpatient Medications on File Prior to Visit   Medication Sig    albuterol (ProAir HFA) 90 mcg/act inhaler Inhale 2 puffs every 6 (six) hours as needed for wheezing    ARIPiprazole (ABILIFY) 5 mg tablet Take 5 mg by mouth daily    Blood Glucose Monitoring Suppl (OneTouch Verio Reflect) w/Device KIT Check blood sugars four times daily. Please substitute with appropriate alternative as covered by patient's insurance. Dx: E11.65    diclofenac (VOLTAREN) 75 mg EC tablet Take 1 tablet (75 mg total) by mouth 2 (two) times a day (Patient taking differently: Take 75 mg by mouth if needed)    divalproex sodium (DEPAKOTE ER) 250 mg 24 hr tablet Take 750 mg by mouth daily    FeroSul 325 (65 Fe) MG tablet take 1 tablet by mouth once daily WITH BREAKFAST    fludrocortisone (FLORINEF) 0.1 mg tablet take 1 tablet by mouth twice a day    glucose blood test strip Test 4 times daily as directed.    Insulin Glargine Solostar (Lantus  SoloStar) 100 UNIT/ML SOPN Inject 0.4 mL (40 Units total) under the skin daily at bedtime    insulin lispro (HumaLOG) 100 units/mL injection pen inject 10 units subcutaneously three times a day with meals    Insulin Pen Needle (Pen Needles) 30G X 5 MM MISC Use 2 (two) times a day    methocarbamol (ROBAXIN) 500 mg tablet Take 1 tablet (500 mg total) by mouth 3 (three) times a day (Patient taking differently: Take 500 mg by mouth daily as needed)    metoprolol succinate (TOPROL-XL) 25 mg 24 hr tablet take 1 tablet by mouth once daily    OneTouch Delica Lancets 33G MISC Check blood sugars four times daily. Please substitute with appropriate alternative as covered by patient's insurance. Dx: E11.65    pantoprazole (PROTONIX) 20 mg tablet TAKE 1 TABLET BY MOUTH 2 TIMES DAILY BEFORE MEALS    topiramate (TOPAMAX) 50 MG tablet Take 1 tablet (50 mg total) by mouth 2 (two) times a day    traZODone (DESYREL) 100 mg tablet Take 200 mg by mouth daily at bedtime    [DISCONTINUED] pregabalin (LYRICA) 75 mg capsule Take 1 capsule (75 mg total) by mouth 2 (two) times a day    [DISCONTINUED] venlafaxine (EFFEXOR-XR) 150 mg 24 hr capsule Take 150 mg by mouth daily    levothyroxine 25 mcg tablet Take 1 tablet (25 mcg total) by mouth daily in the early morning    [DISCONTINUED] aspirin (ECOTRIN LOW STRENGTH) 81 mg EC tablet Take 1 tablet (81 mg total) by mouth daily    [DISCONTINUED] atorvastatin (LIPITOR) 20 mg tablet Take 1 tablet (20 mg total) by mouth daily with dinner    [DISCONTINUED] benzonatate (TESSALON) 200 MG capsule Take 1 capsule (200 mg total) by mouth 3 (three) times a day as needed for cough (Patient not taking: Reported on 1/22/2024)    [DISCONTINUED] hydrOXYzine HCL (ATARAX) 50 mg tablet Take 1 tablet (50 mg total) by mouth daily at bedtime    [DISCONTINUED] lamoTRIgine (LaMICtal) 25 mg tablet Take 1 tablet (25 mg total) by mouth daily    [DISCONTINUED] meloxicam (Mobic) 7.5 mg tablet Take 1 tablet (7.5 mg total) by  "mouth daily (Patient not taking: Reported on 1/22/2024)    [DISCONTINUED] venlafaxine (EFFEXOR-XR) 75 mg 24 hr capsule Take 3 capsules (225 mg total) by mouth daily       Objective     /76 (BP Location: Left arm, Patient Position: Sitting, Cuff Size: Large)   Pulse 103   Temp (!) 97.3 °F (36.3 °C) (Tympanic)   Ht 5' 3\" (1.6 m)   Wt 97.7 kg (215 lb 6.2 oz)   SpO2 97%   BMI 38.15 kg/m²     Physical Exam  Vitals reviewed.   Constitutional:       Appearance: She is obese.   HENT:      Head: Normocephalic and atraumatic.      Right Ear: External ear normal.      Left Ear: External ear normal.   Eyes:      Extraocular Movements: Extraocular movements intact.      Conjunctiva/sclera: Conjunctivae normal.   Neck:      Vascular: No carotid bruit.   Cardiovascular:      Rate and Rhythm: Normal rate and regular rhythm.      Pulses: Normal pulses.      Heart sounds: Normal heart sounds.   Pulmonary:      Effort: Pulmonary effort is normal.      Breath sounds: Normal breath sounds.   Abdominal:      General: Abdomen is flat.      Palpations: Abdomen is soft.   Musculoskeletal:      Right lower leg: No edema.      Left lower leg: No edema.   Neurological:      General: No focal deficit present.      Mental Status: She is alert.   Psychiatric:         Mood and Affect: Mood normal.         Behavior: Behavior normal.       Genevieve Colmenares, DO    "

## 2024-01-22 NOTE — ASSESSMENT & PLAN NOTE
Lab Results   Component Value Date    HGBA1C 8.9 (H) 09/15/2023     Continue Insulin: 40u HS, mealtime 10u +SSI  Continue Lyrica 150mg BID for polyneuropathy and pain  Patient overdue for follow up with her podiatrist - encouraged her to schedule this  Due for Eye exam soon, patient to find new one locally and call if having trouble or needs a referral  Discussed the importance of sticking to a diabetic diet, however patient refuses to do this.  She declines nutrition referral or diabetic education referral.  On Statin  Due for labs  F/u 6 weeks

## 2024-01-22 NOTE — ASSESSMENT & PLAN NOTE
EGD 6/2020 Biopsies and brushings were negative.   Had esophageal manometry 7/22/21: Acid exposure time is 17.3%; DeMeester score is 66; 50 episodes of reflux with 48 in upright position and had to in recumbent position  All episodes were acid reflux episode  She has not followed up with GI and needs a new referral now that she moved to this area from [place].   Continue Protonix 20mg BID

## 2024-01-22 NOTE — ASSESSMENT & PLAN NOTE
Continue Lyrica.  Will resume patient at previous dose of 150 twice a day.  Continue to monitor, and consider referral to spine/pain management at follow-up.

## 2024-01-22 NOTE — ASSESSMENT & PLAN NOTE
Had colonoscopy in 2020 for monitoring of colon polyps. Prep was inadequate and she was to repeat scope a year later but has not done so yet. She moved to this area and needs a referral.

## 2024-01-22 NOTE — ASSESSMENT & PLAN NOTE
Continue diclofenac tab 75 mg twice daily as needed  Patient overdue for follow-up with podiatry, encouraged her to call and schedule this.

## 2024-01-22 NOTE — ASSESSMENT & PLAN NOTE
Patient follows with psychiatry and therapist on a regular basis, and they manage her medications  Continue Abilify, Depakote, trazodone, Effexor

## 2024-02-04 DIAGNOSIS — R13.10 DYSPHAGIA: ICD-10-CM

## 2024-02-05 RX ORDER — PANTOPRAZOLE SODIUM 20 MG/1
20 TABLET, DELAYED RELEASE ORAL
Qty: 60 TABLET | Refills: 1 | Status: SHIPPED | OUTPATIENT
Start: 2024-02-05

## 2024-02-06 DIAGNOSIS — I95.9 HYPOTENSION: ICD-10-CM

## 2024-02-06 RX ORDER — FLUDROCORTISONE ACETATE 0.1 MG/1
TABLET ORAL
Qty: 60 TABLET | Refills: 0 | Status: SHIPPED | OUTPATIENT
Start: 2024-02-06

## 2024-02-08 ENCOUNTER — OFFICE VISIT (OUTPATIENT)
Dept: FAMILY MEDICINE CLINIC | Facility: CLINIC | Age: 44
End: 2024-02-08
Payer: COMMERCIAL

## 2024-02-08 VITALS
BODY MASS INDEX: 39.65 KG/M2 | SYSTOLIC BLOOD PRESSURE: 139 MMHG | WEIGHT: 223.77 LBS | HEIGHT: 63 IN | HEART RATE: 98 BPM | OXYGEN SATURATION: 100 % | DIASTOLIC BLOOD PRESSURE: 86 MMHG

## 2024-02-08 DIAGNOSIS — E11.42 TYPE 2 DIABETES MELLITUS WITH DIABETIC POLYNEUROPATHY, WITHOUT LONG-TERM CURRENT USE OF INSULIN (HCC): Primary | ICD-10-CM

## 2024-02-08 PROCEDURE — 99215 OFFICE O/P EST HI 40 MIN: CPT | Performed by: FAMILY MEDICINE

## 2024-02-08 RX ORDER — LANCETS 33 GAUGE
EACH MISCELLANEOUS
Qty: 400 EACH | Refills: 3 | Status: SHIPPED | OUTPATIENT
Start: 2024-02-08

## 2024-02-08 RX ORDER — BLOOD-GLUCOSE METER
KIT MISCELLANEOUS
Qty: 1 KIT | Refills: 0 | Status: SHIPPED | OUTPATIENT
Start: 2024-02-08

## 2024-02-08 NOTE — PROGRESS NOTES
Name: Rebecca Shaw      : 1980      MRN: [id number]  Encounter Provider: Genevieve Colmenares DO  Encounter Date: 2024   Encounter department: [facility] PRIMARY CARE    Assessment & Plan     1. Type 2 diabetes mellitus with diabetic polyneuropathy, without long-term current use of insulin ([facility])  Assessment & Plan:    Lab Results   Component Value Date    HGBA1C 8.9 (H) 09/15/2023     Continue Insulin: 40u HS, mealtime 10u +SSI  Continue Lyrica 150mg BID for polyneuropathy and pain  Patient overdue for follow up with her podiatrist - encouraged her to schedule this  Due for Eye exam soon, patient to find new one locally and call if having trouble or needs a referral  Discussed the importance of sticking to a diabetic diet, however patient refuses to do this.  DM pharmacy and education referral placed though patient has concerns with adhering to this given trouble with transportation.   I messaged her endocrinology office to see if they can set up a virtual visit and assist with getting continuous glucose monitoring for the patient.   On Statin  Due for labs - previously ordered and rec she get them done soon  Will order her a new POC glucose kit for the interim.   She has appt with me in March, will follow up then.     Orders:  -     Ambulatory Referral to Diabetic Education; Future  -     Ambulatory referral to clinical pharmacy; Future  -     Blood Glucose Monitoring Suppl (OneTouch Verio Reflect) w/Device KIT; Check blood sugars four times daily. Please substitute with appropriate alternative as covered by patient's insurance. Dx: E11.65  -     OneTouch Delica Lancets 33G MISC; Check blood sugars four times daily. Please substitute with appropriate alternative as covered by patient's insurance. Dx: E11.65      I have spent a total time of 45 minutes on 24 in caring for this patient including Instructions for management, Patient and family education, Importance of tx  compliance, Counseling / Coordination of care, Documenting in the medical record, Reviewing / ordering tests, medicine, procedures  , Obtaining or reviewing history  , and Communicating with other healthcare professionals .           Subjective      HPI  Chief Complaint   Patient presents with    Follow-up     E.R visit follow up due to sugar levels being at 500     Patient presents for ER follow up. She was seen in the ED yesterday with hyperglycemia. She was not found to be in DKA. She received fluids and her suger was briefly monitored which improved to 200's range. She was discharged home.  ER labs included troponin, VBG, UA, magnesium, lipase, lactate, CMP, CBC, and multiple POC glucose.  Glucose prior to discharge was 206.  Sodium level was slightly low at 133.  Lactate was 2.5.  UA positive for glucose.  Remaining labs generally unremarkable.  Urine the ER visit she reported polydipsia, headaches, fatigue.  She denied polyuria, diarrhea, nausea or vomiting.  These positive symptoms have since resolved.    Currently she reports he BG has been 316 was at bedtime, 241 fasting this AM, this afternoon 159.   Lantus 40u at bedtime  Lispro 10 units with meals +SSI    Endocrinology: She is supposed to go to the [facility] in [place] but has difficulty with transportation for him.       Review of Systems   Constitutional:  Negative for appetite change, fatigue and unexpected weight change.   Gastrointestinal:  Negative for constipation, diarrhea, nausea and vomiting.   Endocrine: Negative for polydipsia, polyphagia and polyuria.   Neurological:  Negative for headaches.       Current Outpatient Medications on File Prior to Visit   Medication Sig    albuterol (ProAir HFA) 90 mcg/act inhaler Inhale 2 puffs every 6 (six) hours as needed for wheezing    ARIPiprazole (ABILIFY) 5 mg tablet Take 5 mg by mouth daily    aspirin (ECOTRIN LOW STRENGTH) 81 mg EC tablet Take 1 tablet (81 mg total) by mouth daily    atorvastatin  (LIPITOR) 20 mg tablet Take 1 tablet (20 mg total) by mouth daily with dinner    divalproex sodium (DEPAKOTE ER) 250 mg 24 hr tablet Take 750 mg by mouth daily    FeroSul 325 (65 Fe) MG tablet take 1 tablet by mouth once daily WITH BREAKFAST    fludrocortisone (FLORINEF) 0.1 mg tablet take 1 tablet by mouth twice a day    glucose blood test strip Test 4 times daily as directed.    Insulin Glargine Solostar (Lantus SoloStar) 100 UNIT/ML SOPN Inject 0.4 mL (40 Units total) under the skin daily at bedtime    insulin lispro (HumaLOG) 100 units/mL injection pen inject 10 units subcutaneously three times a day with meals    Insulin Pen Needle (Pen Needles) 30G X 5 MM MISC Use 2 (two) times a day    methocarbamol (ROBAXIN) 500 mg tablet Take 1 tablet (500 mg total) by mouth 3 (three) times a day (Patient taking differently: Take 500 mg by mouth daily as needed)    metoprolol succinate (TOPROL-XL) 25 mg 24 hr tablet take 1 tablet by mouth once daily    pantoprazole (PROTONIX) 20 mg tablet take 1 tablet by mouth twice a day before meals    pregabalin (LYRICA) 150 mg capsule Take 1 capsule (150 mg total) by mouth 2 (two) times a day    topiramate (TOPAMAX) 50 MG tablet Take 1 tablet (50 mg total) by mouth 2 (two) times a day    traZODone (DESYREL) 100 mg tablet Take 200 mg by mouth daily at bedtime    venlafaxine (EFFEXOR-XR) 150 mg 24 hr capsule Take 1 capsule (150 mg total) by mouth daily    venlafaxine (EFFEXOR-XR) 75 mg 24 hr capsule Take 1 capsule (75 mg total) by mouth daily    [DISCONTINUED] Blood Glucose Monitoring Suppl (OneTouch Verio Reflect) w/Device KIT Check blood sugars four times daily. Please substitute with appropriate alternative as covered by patient's insurance. Dx: E11.65    [DISCONTINUED] OneTouch Delica Lancets 33G MISC Check blood sugars four times daily. Please substitute with appropriate alternative as covered by patient's insurance. Dx: E11.65    diclofenac (VOLTAREN) 75 mg EC tablet Take 1 tablet  "(75 mg total) by mouth 2 (two) times a day (Patient taking differently: Take 75 mg by mouth if needed)    levothyroxine 25 mcg tablet Take 1 tablet (25 mcg total) by mouth daily in the early morning       Objective     /86 (BP Location: Left arm, Patient Position: Sitting, Cuff Size: Standard)   Pulse 98   Ht 5' 3\" (1.6 m)   Wt 101 kg (223 lb 12.3 oz)   SpO2 100%   BMI 39.64 kg/m²     Physical Exam  Vitals reviewed.   Constitutional:       General: She is not in acute distress.     Appearance: She is obese. She is not ill-appearing.   HENT:      Head: Normocephalic and atraumatic.   Eyes:      Extraocular Movements: Extraocular movements intact.      Conjunctiva/sclera: Conjunctivae normal.   Cardiovascular:      Rate and Rhythm: Normal rate.   Pulmonary:      Effort: Pulmonary effort is normal.   Skin:     General: Skin is warm and dry.   Neurological:      General: No focal deficit present.      Mental Status: She is alert.   Psychiatric:         Mood and Affect: Mood normal.         Behavior: Behavior normal.       Genevieve Colmenares, DO    "

## 2024-02-08 NOTE — ASSESSMENT & PLAN NOTE
Lab Results   Component Value Date    HGBA1C 8.9 (H) 09/15/2023     Continue Insulin: 40u HS, mealtime 10u +SSI  Continue Lyrica 150mg BID for polyneuropathy and pain  Patient overdue for follow up with her podiatrist - encouraged her to schedule this  Due for Eye exam soon, patient to find new one locally and call if having trouble or needs a referral  Discussed the importance of sticking to a diabetic diet, however patient refuses to do this.  DM pharmacy and education referral placed though patient has concerns with adhering to this given trouble with transportation.   I messaged her endocrinology office to see if they can set up a virtual visit and assist with getting continuous glucose monitoring for the patient.   On Statin  Due for labs - previously ordered and rec she get them done soon  Will order her a new POC glucose kit for the interim.   She has appt with me in March, will follow up then.

## 2024-02-12 ENCOUNTER — LAB (OUTPATIENT)
Dept: LAB | Facility: CLINIC | Age: 44
End: 2024-02-12
Payer: COMMERCIAL

## 2024-02-12 DIAGNOSIS — D50.9 IRON DEFICIENCY ANEMIA, UNSPECIFIED IRON DEFICIENCY ANEMIA TYPE: ICD-10-CM

## 2024-02-12 DIAGNOSIS — E03.9 HYPOTHYROIDISM, UNSPECIFIED TYPE: ICD-10-CM

## 2024-02-12 DIAGNOSIS — E11.42 TYPE 2 DIABETES MELLITUS WITH DIABETIC POLYNEUROPATHY, WITHOUT LONG-TERM CURRENT USE OF INSULIN (HCC): ICD-10-CM

## 2024-02-12 LAB
ALBUMIN SERPL BCP-MCNC: 3.9 G/DL (ref 3.5–5)
ALP SERPL-CCNC: 90 U/L (ref 34–104)
ALT SERPL W P-5'-P-CCNC: 30 U/L (ref 7–52)
ANION GAP SERPL CALCULATED.3IONS-SCNC: 9 MMOL/L
AST SERPL W P-5'-P-CCNC: 21 U/L (ref 13–39)
BASOPHILS # BLD AUTO: 0.05 THOUSANDS/ÂΜL (ref 0–0.1)
BASOPHILS NFR BLD AUTO: 1 % (ref 0–1)
BILIRUB SERPL-MCNC: 0.42 MG/DL (ref 0.2–1)
BUN SERPL-MCNC: 14 MG/DL (ref 5–25)
CALCIUM SERPL-MCNC: 8.7 MG/DL (ref 8.4–10.2)
CHLORIDE SERPL-SCNC: 105 MMOL/L (ref 96–108)
CHOLEST SERPL-MCNC: 135 MG/DL
CO2 SERPL-SCNC: 23 MMOL/L (ref 21–32)
CREAT SERPL-MCNC: 0.89 MG/DL (ref 0.6–1.3)
CREAT UR-MCNC: 118.2 MG/DL
EOSINOPHIL # BLD AUTO: 0.19 THOUSAND/ÂΜL (ref 0–0.61)
EOSINOPHIL NFR BLD AUTO: 2 % (ref 0–6)
ERYTHROCYTE [DISTWIDTH] IN BLOOD BY AUTOMATED COUNT: 12.8 % (ref 11.6–15.1)
FERRITIN SERPL-MCNC: 56 NG/ML (ref 11–307)
GFR SERPL CREATININE-BSD FRML MDRD: 80 ML/MIN/1.73SQ M
GLUCOSE P FAST SERPL-MCNC: 233 MG/DL (ref 65–99)
HCT VFR BLD AUTO: 44.1 % (ref 34.8–46.1)
HDLC SERPL-MCNC: 47 MG/DL
HGB BLD-MCNC: 14.9 G/DL (ref 11.5–15.4)
IMM GRANULOCYTES # BLD AUTO: 0.02 THOUSAND/UL (ref 0–0.2)
IMM GRANULOCYTES NFR BLD AUTO: 0 % (ref 0–2)
IRON SATN MFR SERPL: 25 % (ref 15–50)
IRON SERPL-MCNC: 84 UG/DL (ref 50–212)
LDLC SERPL CALC-MCNC: 50 MG/DL (ref 0–100)
LYMPHOCYTES # BLD AUTO: 3.48 THOUSANDS/ÂΜL (ref 0.6–4.47)
LYMPHOCYTES NFR BLD AUTO: 40 % (ref 14–44)
MCH RBC QN AUTO: 31.1 PG (ref 26.8–34.3)
MCHC RBC AUTO-ENTMCNC: 33.8 G/DL (ref 31.4–37.4)
MCV RBC AUTO: 92 FL (ref 82–98)
MICROALBUMIN UR-MCNC: <7 MG/L
MICROALBUMIN/CREAT 24H UR: <6 MG/G CREATININE (ref 0–30)
MONOCYTES # BLD AUTO: 0.69 THOUSAND/ÂΜL (ref 0.17–1.22)
MONOCYTES NFR BLD AUTO: 8 % (ref 4–12)
NEUTROPHILS # BLD AUTO: 4.18 THOUSANDS/ÂΜL (ref 1.85–7.62)
NEUTS SEG NFR BLD AUTO: 49 % (ref 43–75)
NRBC BLD AUTO-RTO: 0 /100 WBCS
PLATELET # BLD AUTO: 227 THOUSANDS/UL (ref 149–390)
PMV BLD AUTO: 11.1 FL (ref 8.9–12.7)
POTASSIUM SERPL-SCNC: 4 MMOL/L (ref 3.5–5.3)
PROT SERPL-MCNC: 7.3 G/DL (ref 6.4–8.4)
RBC # BLD AUTO: 4.79 MILLION/UL (ref 3.81–5.12)
SODIUM SERPL-SCNC: 137 MMOL/L (ref 135–147)
TIBC SERPL-MCNC: 340 UG/DL (ref 250–450)
TRIGL SERPL-MCNC: 192 MG/DL
TSH SERPL DL<=0.05 MIU/L-ACNC: 2.61 UIU/ML (ref 0.45–4.5)
UIBC SERPL-MCNC: 256 UG/DL (ref 155–355)
WBC # BLD AUTO: 8.61 THOUSAND/UL (ref 4.31–10.16)

## 2024-02-12 PROCEDURE — 82043 UR ALBUMIN QUANTITATIVE: CPT

## 2024-02-12 PROCEDURE — 84443 ASSAY THYROID STIM HORMONE: CPT

## 2024-02-12 PROCEDURE — 82728 ASSAY OF FERRITIN: CPT

## 2024-02-12 PROCEDURE — 83036 HEMOGLOBIN GLYCOSYLATED A1C: CPT

## 2024-02-12 PROCEDURE — 83550 IRON BINDING TEST: CPT

## 2024-02-12 PROCEDURE — 82570 ASSAY OF URINE CREATININE: CPT

## 2024-02-12 PROCEDURE — 36415 COLL VENOUS BLD VENIPUNCTURE: CPT

## 2024-02-12 PROCEDURE — 80061 LIPID PANEL: CPT

## 2024-02-12 PROCEDURE — 85025 COMPLETE CBC W/AUTO DIFF WBC: CPT

## 2024-02-12 PROCEDURE — 80053 COMPREHEN METABOLIC PANEL: CPT

## 2024-02-12 PROCEDURE — 83540 ASSAY OF IRON: CPT

## 2024-02-13 LAB
EST. AVERAGE GLUCOSE BLD GHB EST-MCNC: 214 MG/DL
HBA1C MFR BLD: 9.1 %

## 2024-02-15 ENCOUNTER — APPOINTMENT (OUTPATIENT)
Dept: LAB | Facility: CLINIC | Age: 44
End: 2024-02-15
Payer: COMMERCIAL

## 2024-02-15 DIAGNOSIS — F33.2 SEVERE RECURRENT MAJOR DEPRESSION WITHOUT PSYCHOTIC FEATURES (HCC): ICD-10-CM

## 2024-02-15 LAB — VALPROATE SERPL-MCNC: 51 UG/ML (ref 50–100)

## 2024-02-15 PROCEDURE — 80164 ASSAY DIPROPYLACETIC ACD TOT: CPT

## 2024-02-15 PROCEDURE — 36415 COLL VENOUS BLD VENIPUNCTURE: CPT

## 2024-02-16 ENCOUNTER — TELEMEDICINE (OUTPATIENT)
Dept: ENDOCRINOLOGY | Facility: CLINIC | Age: 44
End: 2024-02-16
Payer: COMMERCIAL

## 2024-02-16 VITALS — WEIGHT: 215 LBS | HEIGHT: 63 IN | BODY MASS INDEX: 38.09 KG/M2

## 2024-02-16 DIAGNOSIS — Z79.4 TYPE 2 DIABETES MELLITUS WITH DIABETIC POLYNEUROPATHY, WITH LONG-TERM CURRENT USE OF INSULIN (HCC): Primary | ICD-10-CM

## 2024-02-16 DIAGNOSIS — E11.42 TYPE 2 DIABETES MELLITUS WITH DIABETIC POLYNEUROPATHY, WITH LONG-TERM CURRENT USE OF INSULIN (HCC): Primary | ICD-10-CM

## 2024-02-16 DIAGNOSIS — E78.2 MIXED HYPERLIPIDEMIA: ICD-10-CM

## 2024-02-16 PROCEDURE — 99213 OFFICE O/P EST LOW 20 MIN: CPT | Performed by: STUDENT IN AN ORGANIZED HEALTH CARE EDUCATION/TRAINING PROGRAM

## 2024-02-16 RX ORDER — MIRTAZAPINE 7.5 MG/1
7.5 TABLET, FILM COATED ORAL
COMMUNITY

## 2024-02-16 NOTE — PROGRESS NOTES
Rebecca Shaw 43 y.o. female MRN: [id number]    Encounter: [id number]      Assessment/Plan   Problem List Items Addressed This Visit          Endocrine    Type 2 diabetes mellitus with diabetic polyneuropathy, with long-term current use of insulin (HCC) - Primary       Lab Results   Component Value Date    HGBA1C 9.1 (H) 02/12/2024   Pharmacotherapy discussion - recommend increase Lantus from 40 to 46 units daily, increase Novolog from 10 --> 12 units TID AC,continue scale.  Will attempt Rx for Soraya 3 sensors - appears compatible with phone, may be preferred by insurance versus Dexcom.   Labs - will plan for repeat A1c in office at next visit.  Follow up - recommend routine follow up visit in 3 months.         Relevant Medications    Continuous Blood Gluc Sensor (FreeStyle Soraya 3 Sensor) MISC       Other    Mixed hyperlipidemia     LDL at goal, but triglycerides elevated 192.   Continue statin. - atorvastatin 20mg daily.           Virtual AWV Consent    Verification of patient location:   Patient is located at Home in the following state in which I hold an active license [place]    The patient was identified by name and date of birth. Rebecca Shaw was informed that this is a telemedicine visit and that the visit is being conducted through the [employer] platform. She agrees to proceed..  My office door was closed. No one else was in the room.  She acknowledged consent and understanding of privacy and security of the video platform. The patient has agreed to participate and understands they can discontinue the visit at any time.    Patient is aware this is a billable service.   Reason for visit is diabetes follow-up  Encounter provider Hilary Leo PA-C    Provider located at [facility]  [address]  [place] [place] [address] [phone]      Recent Visits  No visits were found meeting these conditions.  Showing recent  visits within past 7 days and meeting all other requirements  Today's Visits  Date Type Provider Dept   02/16/24 Telemedicine Hilary Leo PA-C  [facility]   Showing today's visits and meeting all other requirements  Future Appointments  No visits were found meeting these conditions.  Showing future appointments within next 150 days and meeting all other requirements           Visit Time  Video visit duration: 18 minutes  Total Visit Duration: 28 minutes    CC: Diabetes  History of Present Illness     HPI:  Natacha Is a type II diabetic with long-term use of insulin who is a virtual visit today for close interval follow-up following recent ER visit for hyperglycemia.  Complications from diabetes include neuropathy.     Her last visit with endocrinology was March 2022. Notes reviewed from this visit as well as recent ER visit on 2/7/24, primary care visit 2/8/24.    BG monitoring is via fingerstick.  Natacha reports blood glucose is < 200 more consistently recently.   4x times.   FBG today 170.   Before lunch today - 186  Typical before dinner BG - 200 - 300.  Typical before bedtime BG - 200 - 300    Interested in using CGM. Dexcom Rx was attempted before without success.    Current diabetes medications reviewed in detail:  Lantus 40 units at bedtime.  Humalog 10 units TID AC plus scale.  Usually varies between 12-18 units daily.    She has previous experience on metformin, uncertain impact and tolerability.  Diet is variable in timing and content as well as frequency; she consumes 1-3 meals daily.          Review of Systems   Constitutional:  Negative for chills and fever.   HENT:  Negative for ear pain and sore throat.    Eyes:  Negative for pain and visual disturbance.   Respiratory:  Negative for cough and shortness of breath.    Cardiovascular:  Negative for chest pain and palpitations.   Gastrointestinal:  Negative for abdominal pain and vomiting.   Genitourinary:  Negative for dysuria  and hematuria.   Musculoskeletal:  Negative for arthralgias and back pain.   Skin:  Negative for color change and rash.   Neurological:  Negative for seizures and syncope.   All other systems reviewed and are negative.      Historical Information   Past Medical History:   Diagnosis Date    Anxiety     Resolved:16    Colon polyp     Depression 2018    Diabetes mellitus (HCC)     GERD (gastroesophageal reflux disease)     Migraine     Mood disorder (HCC)     Neuropathy     Obesity     Peripheral neuropathy     PTSD (post-traumatic stress disorder)     Sacroiliitis (HCC) 10/19/2018    Sleep difficulties      Past Surgical History:   Procedure Laterality Date    CARDIAC CATHETERIZATION       SECTION      CHOLECYSTECTOMY      FOOT SURGERY Right     TUBAL LIGATION       Social History   Social History     Substance and Sexual Activity   Alcohol Use Yes    Comment: occasional     Social History     Substance and Sexual Activity   Drug Use No     Social History     Tobacco Use   Smoking Status Former    Current packs/day: 0.00    Types: Cigarettes    Quit date: 2018    Years since quittin.2   Smokeless Tobacco Never   Tobacco Comments    occassionally     Family History:   Family History   Problem Relation Age of Onset    Heart failure Mother     Diabetes Mother     Heart failure Father     Diabetes Father     Diabetes Brother        Meds/Allergies   Current Outpatient Medications   Medication Sig Dispense Refill    albuterol (ProAir HFA) 90 mcg/act inhaler Inhale 2 puffs every 6 (six) hours as needed for wheezing 8.5 g 0    ARIPiprazole (ABILIFY) 5 mg tablet Take 5 mg by mouth daily      aspirin (ECOTRIN LOW STRENGTH) 81 mg EC tablet Take 1 tablet (81 mg total) by mouth daily 90 tablet 1    atorvastatin (LIPITOR) 20 mg tablet Take 1 tablet (20 mg total) by mouth daily with dinner 90 tablet 1    Blood Glucose Monitoring Suppl (OneTouch Verio Reflect) w/Device KIT Check blood sugars four times daily.  Please substitute with appropriate alternative as covered by patient's insurance. Dx: E11.65 1 kit 0    diclofenac (VOLTAREN) 75 mg EC tablet Take 1 tablet (75 mg total) by mouth 2 (two) times a day (Patient taking differently: Take 75 mg by mouth if needed) 60 tablet 2    divalproex sodium (DEPAKOTE ER) 250 mg 24 hr tablet Take 750 mg by mouth daily      FeroSul 325 (65 Fe) MG tablet take 1 tablet by mouth once daily WITH BREAKFAST 30 tablet 5    fludrocortisone (FLORINEF) 0.1 mg tablet take 1 tablet by mouth twice a day 60 tablet 0    glucose blood test strip Test 4 times daily as directed. 100 strip 5    Insulin Glargine Solostar (Lantus SoloStar) 100 UNIT/ML SOPN Inject 0.4 mL (40 Units total) under the skin daily at bedtime 15 mL 0    insulin lispro (HumaLOG) 100 units/mL injection pen inject 10 units subcutaneously three times a day with meals 15 mL 0    Insulin Pen Needle (Pen Needles) 30G X 5 MM MISC Use 2 (two) times a day 100 each 2    levothyroxine 25 mcg tablet Take 1 tablet (25 mcg total) by mouth daily in the early morning 30 tablet 0    methocarbamol (ROBAXIN) 500 mg tablet Take 1 tablet (500 mg total) by mouth 3 (three) times a day (Patient taking differently: Take 500 mg by mouth daily as needed) 90 tablet 1    metoprolol succinate (TOPROL-XL) 25 mg 24 hr tablet take 1 tablet by mouth once daily 30 tablet 0    OneTouch Delica Lancets 33G MISC Check blood sugars four times daily. Please substitute with appropriate alternative as covered by patient's insurance. Dx: E11.65 400 each 3    pantoprazole (PROTONIX) 20 mg tablet take 1 tablet by mouth twice a day before meals 60 tablet 1    pregabalin (LYRICA) 150 mg capsule Take 1 capsule (150 mg total) by mouth 2 (two) times a day 180 capsule 1    topiramate (TOPAMAX) 50 MG tablet Take 1 tablet (50 mg total) by mouth 2 (two) times a day 60 tablet 2    traZODone (DESYREL) 100 mg tablet Take 200 mg by mouth daily at bedtime      venlafaxine (EFFEXOR-XR) 150  "mg 24 hr capsule Take 1 capsule (150 mg total) by mouth daily      venlafaxine (EFFEXOR-XR) 75 mg 24 hr capsule Take 1 capsule (75 mg total) by mouth daily       No current facility-administered medications for this visit.     Allergies   Allergen Reactions    [place] Oil - Food Allergy Anaphylaxis     Patient stated that she allergic to orange juice    Oxycodone-Acetaminophen Itching and Anaphylaxis    Orange (Diagnostic) - Food Allergy Hives and Itching    Orange Fruit [Citrus - Food Allergy] Hives     Pt states she has had since a child       Objective   Vitals: Height 5' 3\" (1.6 m), weight 97.5 kg (215 lb), not currently breastfeeding.   No vitals taken due to virtual visit.    Physical Exam  Vitals reviewed.   Constitutional:       General: She is not in acute distress.     Appearance: She is not ill-appearing.   Pulmonary:      Effort: No respiratory distress.   Neurological:      Mental Status: She is alert.   Psychiatric:         Mood and Affect: Mood normal.         The history was obtained from the review of the chart, patient.    Lab Results:     Component      Latest Ref Rng 2/12/2024   WBC      4.31 - 10.16 Thousand/uL 8.61    RBC      3.81 - 5.12 Million/uL 4.79    Hemoglobin      11.5 - 15.4 g/dL 14.9    Hematocrit      34.8 - 46.1 % 44.1    MCV      82 - 98 fL 92    MCH      26.8 - 34.3 pg 31.1    MCHC      31.4 - 37.4 g/dL 33.8    RDW      11.6 - 15.1 % 12.8    MPV      8.9 - 12.7 fL 11.1    Platelet Count      149 - 390 Thousands/uL 227    nRBC      /100 WBCs 0    Neutrophils %      43 - 75 % 49    Immat GRANS %      0 - 2 % 0    Lymphocytes Relative      14 - 44 % 40    Monocytes Relative      4 - 12 % 8    Eosinophils      0 - 6 % 2    Basophils Relative      0 - 1 % 1    Absolute Neutrophils      1.85 - 7.62 Thousands/µL 4.18    Immature Grans Absolute      0.00 - 0.20 Thousand/uL 0.02    Lymphocytes Absolute      0.60 - 4.47 Thousands/µL 3.48    Absolute Monocytes      0.17 - 1.22 Thousand/µL " "0.69    Absolute Eosinophils      0.00 - 0.61 Thousand/µL 0.19    Basophils Absolute      0.00 - 0.10 Thousands/µL 0.05    Sodium      135 - 147 mmol/L 137    Potassium      3.5 - 5.3 mmol/L 4.0    Chloride      96 - 108 mmol/L 105    Carbon Dioxide      21 - 32 mmol/L 23    ANION GAP      mmol/L 9    BUN      5 - 25 mg/dL 14    Creatinine      0.60 - 1.30 mg/dL 0.89    GLUCOSE, FASTING      65 - 99 mg/dL 233 (H)    Calcium      8.4 - 10.2 mg/dL 8.7    AST      13 - 39 U/L 21    ALT      7 - 52 U/L 30    ALK PHOS      34 - 104 U/L 90    Total Protein      6.4 - 8.4 g/dL 7.3    Albumin      3.5 - 5.0 g/dL 3.9    Total Bilirubin      0.20 - 1.00 mg/dL 0.42    GFR, Calculated      ml/min/1.73sq m 80    Cholesterol      See Comment mg/dL 135    Triglycerides      See Comment mg/dL 192 (H)    HDL      >=50 mg/dL 47 (L)    LDL Calculated      0 - 100 mg/dL 50    Iron Saturation      15 - 50 % 25    TIBC      250 - 450 ug/dL 340    Iron      50 - 212 ug/dL 84    UIBC      155 - 355 ug/dL 256    EXT Creatinine Urine      Reference range not established. mg/dL 118.2    Albumin,U,Random      <20.0 mg/L <7.0    Albumin Creat Ratio      0 - 30 mg/g creatinine <6    TSH 3RD GENERATON      0.450 - 4.500 uIU/mL 2.612    Ferritin      11 - 307 ng/mL 56       Legend:  (H) High  (L) Low      Component      Latest Ref Rng 9/9/2022 2/23/2023 4/1/2023 9/15/2023 2/12/2024   Hemoglobin A1C      Normal 4.0-5.6%; PreDiabetic 5.7-6.4%; Diabetic >=6.5%; Glycemic control for adults with diabetes <7.0% % 12.4 (H)  12.1 (H)  9.5 (H) (E) 8.9 (H) (E) 9.1 (H)    eAG, EST AVG Glucose      mg/dl 309  301  226 (E) 209 (E) 214       Legend:  (H) High  (E) External lab result    Imaging Studies: n/a    Portions of the record may have been created with voice recognition software. Occasional wrong word or \"sound a like\" substitutions may have occurred due to the inherent limitations of voice recognition software. Read the chart carefully and recognize, " using context, where substitutions have occurred.

## 2024-02-16 NOTE — ASSESSMENT & PLAN NOTE
Lab Results   Component Value Date    HGBA1C 9.1 (H) 02/12/2024   Pharmacotherapy discussion - recommend increase Lantus from 40 to 46 units daily, increase Novolog from 10 --> 12 units TID AC,continue scale.  Will attempt Rx for Soraya 3 sensors - appears compatible with phone, may be preferred by insurance versus Dexcom.   Labs - will plan for repeat A1c in office at next visit.  Follow up - recommend routine follow up visit in 3 months.

## 2024-02-20 ENCOUNTER — TELEPHONE (OUTPATIENT)
Dept: ENDOCRINOLOGY | Facility: CLINIC | Age: 44
End: 2024-02-20

## 2024-02-20 PROBLEM — Z79.4 TYPE 2 DIABETES MELLITUS WITH DIABETIC POLYNEUROPATHY, WITH LONG-TERM CURRENT USE OF INSULIN (HCC): Status: ACTIVE | Noted: 2018-04-26

## 2024-02-20 RX ORDER — BLOOD-GLUCOSE SENSOR
1 EACH MISCELLANEOUS
Qty: 6 EACH | Refills: 3 | Status: SHIPPED | OUTPATIENT
Start: 2024-02-20

## 2024-02-20 NOTE — TELEPHONE ENCOUNTER
----- Message from Cynthia Mondoro, MA sent at 2/20/2024 12:08 PM EST -----  Regarding: FW: schedule follow up visit.    ----- Message -----  From: Hilary Leo PA-C  Sent: 2/20/2024  11:59 AM EST  To: #  Subject: schedule follow up visit.                        Natacha was a Virtual visit this past Friday.  Please call to help schedule follow up appointment on or just after May 12th if possible.  No labs needed, We will plan to collect A1c in office at that time.

## 2024-02-21 ENCOUNTER — OFFICE VISIT (OUTPATIENT)
Dept: URGENT CARE | Facility: CLINIC | Age: 44
End: 2024-02-21
Payer: COMMERCIAL

## 2024-02-21 VITALS
OXYGEN SATURATION: 98 % | SYSTOLIC BLOOD PRESSURE: 118 MMHG | HEIGHT: 63 IN | WEIGHT: 215 LBS | TEMPERATURE: 97 F | BODY MASS INDEX: 38.09 KG/M2 | DIASTOLIC BLOOD PRESSURE: 76 MMHG | RESPIRATION RATE: 18 BRPM | HEART RATE: 96 BPM

## 2024-02-21 DIAGNOSIS — S00.81XA ABRASION OF FACE, INITIAL ENCOUNTER: Primary | ICD-10-CM

## 2024-02-21 DIAGNOSIS — S89.91XA INJURY OF RIGHT KNEE, INITIAL ENCOUNTER: ICD-10-CM

## 2024-02-21 DIAGNOSIS — S09.90XA INJURY OF HEAD, INITIAL ENCOUNTER: ICD-10-CM

## 2024-02-21 PROCEDURE — 99213 OFFICE O/P EST LOW 20 MIN: CPT

## 2024-02-22 ENCOUNTER — TELEPHONE (OUTPATIENT)
Dept: DIABETES SERVICES | Facility: CLINIC | Age: 44
End: 2024-02-22

## 2024-02-22 NOTE — TELEPHONE ENCOUNTER
Logged on early to our virtual session. Waited for patient to arrive. Called patient on both listed phone numbers when session should have started and left messages. Stayed on virtual session greater than 15 minutes after session start before logging off.

## 2024-03-08 DIAGNOSIS — R00.2 HEART PALPITATIONS: ICD-10-CM

## 2024-03-08 RX ORDER — METOPROLOL SUCCINATE 25 MG/1
25 TABLET, EXTENDED RELEASE ORAL DAILY
Qty: 30 TABLET | Refills: 5 | Status: SHIPPED | OUTPATIENT
Start: 2024-03-08

## 2024-03-08 NOTE — TELEPHONE ENCOUNTER
Patient requesting refill for Metoprolol sent to [facility], no refills left, has been out of medication for a couple days

## 2024-03-19 ENCOUNTER — PATIENT MESSAGE (OUTPATIENT)
Dept: FAMILY MEDICINE CLINIC | Facility: CLINIC | Age: 44
End: 2024-03-19

## 2024-03-19 DIAGNOSIS — E11.42 TYPE 2 DIABETES MELLITUS WITH DIABETIC POLYNEUROPATHY, WITHOUT LONG-TERM CURRENT USE OF INSULIN (HCC): ICD-10-CM

## 2024-03-19 RX ORDER — INSULIN LISPRO 100 [IU]/ML
12 INJECTION, SOLUTION INTRAVENOUS; SUBCUTANEOUS
Qty: 15 ML | Refills: 3 | Status: SHIPPED | OUTPATIENT
Start: 2024-03-19 | End: 2024-03-22 | Stop reason: SDUPTHER

## 2024-03-22 ENCOUNTER — OFFICE VISIT (OUTPATIENT)
Dept: FAMILY MEDICINE CLINIC | Facility: CLINIC | Age: 44
End: 2024-03-22
Payer: COMMERCIAL

## 2024-03-22 VITALS
HEIGHT: 63 IN | TEMPERATURE: 97.7 F | HEART RATE: 85 BPM | WEIGHT: 225.09 LBS | BODY MASS INDEX: 39.88 KG/M2 | DIASTOLIC BLOOD PRESSURE: 75 MMHG | SYSTOLIC BLOOD PRESSURE: 140 MMHG | OXYGEN SATURATION: 98 %

## 2024-03-22 DIAGNOSIS — E11.42 TYPE 2 DIABETES MELLITUS WITH DIABETIC POLYNEUROPATHY, WITH LONG-TERM CURRENT USE OF INSULIN (HCC): ICD-10-CM

## 2024-03-22 DIAGNOSIS — Z23 ENCOUNTER FOR IMMUNIZATION: ICD-10-CM

## 2024-03-22 DIAGNOSIS — Z53.20 SCREENING MAMMOGRAPHY DECLINED: ICD-10-CM

## 2024-03-22 DIAGNOSIS — Z11.3 SCREENING FOR STD (SEXUALLY TRANSMITTED DISEASE): ICD-10-CM

## 2024-03-22 DIAGNOSIS — Z00.00 ANNUAL PHYSICAL EXAM: Primary | ICD-10-CM

## 2024-03-22 DIAGNOSIS — Z79.4 TYPE 2 DIABETES MELLITUS WITH DIABETIC POLYNEUROPATHY, WITH LONG-TERM CURRENT USE OF INSULIN (HCC): ICD-10-CM

## 2024-03-22 DIAGNOSIS — M54.50 CHRONIC BILATERAL LOW BACK PAIN WITHOUT SCIATICA: ICD-10-CM

## 2024-03-22 DIAGNOSIS — G89.29 CHRONIC BILATERAL LOW BACK PAIN WITHOUT SCIATICA: ICD-10-CM

## 2024-03-22 PROCEDURE — 99396 PREV VISIT EST AGE 40-64: CPT | Performed by: FAMILY MEDICINE

## 2024-03-22 PROCEDURE — 90632 HEPA VACCINE ADULT IM: CPT | Performed by: FAMILY MEDICINE

## 2024-03-22 PROCEDURE — 90677 PCV20 VACCINE IM: CPT | Performed by: FAMILY MEDICINE

## 2024-03-22 PROCEDURE — 90471 IMMUNIZATION ADMIN: CPT | Performed by: FAMILY MEDICINE

## 2024-03-22 PROCEDURE — 90472 IMMUNIZATION ADMIN EACH ADD: CPT | Performed by: FAMILY MEDICINE

## 2024-03-22 RX ORDER — INSULIN GLARGINE 100 [IU]/ML
46 INJECTION, SOLUTION SUBCUTANEOUS
Start: 2024-03-22

## 2024-03-22 RX ORDER — INSULIN LISPRO 100 [IU]/ML
12 INJECTION, SOLUTION INTRAVENOUS; SUBCUTANEOUS
Qty: 15 ML | Refills: 3 | Status: SHIPPED | OUTPATIENT
Start: 2024-03-22

## 2024-03-22 RX ORDER — DICLOFENAC SODIUM 75 MG/1
75 TABLET, DELAYED RELEASE ORAL AS NEEDED
Start: 2024-03-22

## 2024-03-22 NOTE — PROGRESS NOTES
ADULT ANNUAL PHYSICAL  [facility] PRIMARY CARE    NAME: Rebecca Shaw  AGE: 43 y.o. SEX: female  : 1980     DATE: 3/22/2024     Assessment and Plan:     Problem List Items Addressed This Visit       Type 2 diabetes mellitus with diabetic polyneuropathy, with long-term current use of insulin (HCC)    Relevant Medications    Insulin Glargine Solostar (Lantus SoloStar) 100 UNIT/ML SOPN    insulin lispro (HumaLOG) 100 units/mL injection pen    Chronic bilateral low back pain without sciatica    Relevant Medications    diclofenac (VOLTAREN) 75 mg EC tablet    Screening mammography declined     Other Visit Diagnoses       Annual physical exam    -  Primary    Encounter for immunization        Relevant Orders    Pneumococcal Conjugate Vaccine 20-valent (Pcv20) (Completed)    HEPATITIS A VACCINE ADULT IM (Completed)    Screening for STD (sexually transmitted disease)        Relevant Orders    Chlamydia/GC amplified DNA by PCR    RPR-Syphilis Screening (Total Syphilis IGG/IGM)    HIV 1/2 AB/AG w Reflex [facility] for 2 yr old and above          Hepatitis A and pneumonia vaccine completed today  Patient will return in 4 weeks for cervical cancer screening  Patient has upcoming GI appointment and will schedule colorectal cancer screening from there  Patient declines breast cancer screening at this time  She has upcoming eye exam scheduled for her diabetic eye care    Immunizations and preventive care screenings were discussed with patient today. Appropriate education was printed on patient's after visit summary.      Depression Screening and Follow-up Plan: Patient's depression screening was positive with a PHQ-9 score of 19. Continue regular follow-up with their mental health provider who is managing their mental health condition(s).         Return in about 3 months (around 2024) for follow up chronic conditions.     Chief Complaint:     Chief Complaint    Patient presents with    Physical Exam     Annual physical         History of Present Illness:     Adult Annual Physical   Patient here for a comprehensive physical exam. Needs refill of insulin     Diet and Physical Activity  Diet/Nutrition: does not follow any diet plan. Continues to decline nutrition specialist.   Exercise: walking, 3-4 times a week on average, and 30-60 minutes on average. Goes to gym as well with , about 2x per week, combination of cardio and strength training.      Depression Screening  PHQ-2/9 Depression Screening    Little interest or pleasure in doing things: 3 - nearly every day  Feeling down, depressed, or hopeless: 2 - more than half the days  Trouble falling or staying asleep, or sleeping too much: 2 - more than half the days  Feeling tired or having little energy: 3 - nearly every day  Poor appetite or overeatin - more than half the days  Feeling bad about yourself - or that you are a failure or have let yourself or your family down: 2 - more than half the days  Trouble concentrating on things, such as reading the newspaper or watching television: 2 - more than half the days  Moving or speaking so slowly that other people could have noticed. Or the opposite - being so fidgety or restless that you have been moving around a lot more than usual: 1 - several days  Thoughts that you would be better off dead, or of hurting yourself in some way: 2 - more than half the days  PHQ-9 Score: 19  PHQ-9 Interpretation: Moderately severe depression       ABNER-7 Flowsheet Screening      Flowsheet Row Most Recent Value   Over the last 2 weeks, how often have you been bothered by any of the following problems?    Feeling nervous, anxious, or on edge 2   Not being able to stop or control worrying 3   Worrying too much about different things 3   Trouble relaxing 3   Being so restless that it is hard to sit still 2   Becoming easily annoyed or irritable 1   Feeling afraid as if something  awful might happen 1   ABNER-7 Total Score 15            General Health  Sleep: sleeps well.   Hearing: normal - bilateral.  Vision: goes for regular eye exams.   Dental:  no teeth, has plan to have them all removed  .       /GYN Health  Follows with gynecology? No, we will bring her back for pap screening in 4 weeks       Review of Systems:     Review of Systems   Constitutional:  Negative for activity change, appetite change, chills, diaphoresis, fever and unexpected weight change.   HENT:  Negative for congestion, rhinorrhea, sore throat and trouble swallowing.    Eyes:  Negative for visual disturbance.   Respiratory:  Negative for cough, shortness of breath and wheezing.    Cardiovascular:  Negative for chest pain, palpitations and leg swelling.   Gastrointestinal:  Negative for abdominal pain, blood in stool, constipation and diarrhea.   Genitourinary:  Negative for difficulty urinating, dysuria, frequency and hematuria.   Musculoskeletal:  Negative for arthralgias, back pain and joint swelling.   Skin:  Negative for color change and rash.   Neurological:  Negative for dizziness, light-headedness and headaches.   Psychiatric/Behavioral:  Positive for dysphoric mood. The patient is nervous/anxious.       Past Medical History:     Past Medical History:   Diagnosis Date    Anxiety     Resolved:16    Colon polyp     Depression 2018    Diabetes mellitus (HCC)     GERD (gastroesophageal reflux disease)     Migraine     Mood disorder (HCC)     Neuropathy     Obesity     Peripheral neuropathy     PTSD (post-traumatic stress disorder)     Sacroiliitis (HCC) 10/19/2018    Sleep difficulties       Past Surgical History:     Past Surgical History:   Procedure Laterality Date    CARDIAC CATHETERIZATION       SECTION      CHOLECYSTECTOMY      FOOT SURGERY Right     TUBAL LIGATION        Social History:     Social History     Socioeconomic History    Marital status:      Spouse name: None    Number  of children: 4    Years of education: None    Highest education level: None   Occupational History    Occupation: unemployeed   Tobacco Use    Smoking status: Former     Current packs/day: 0.00     Types: Cigarettes     Quit date: 2018     Years since quittin.3    Smokeless tobacco: Never    Tobacco comments:     occassionally   Vaping Use    Vaping status: Every Day    Substances: Nicotine, Flavoring   Substance and Sexual Activity    Alcohol use: Yes     Comment: occasional    Drug use: No    Sexual activity: Yes     Partners: Male   Other Topics Concern    None   Social History Narrative    Lives with domestic partner     Social Determinants of Health     Financial Resource Strain: High Risk (3/31/2023)    Received from [facility]    Overall Financial Resource Strain (CARDIA)     Difficulty of Paying Living Expenses: Very hard   Food Insecurity: Food Insecurity Present (3/31/2023)    Received from [facility]    Hunger Vital Sign     Worried About Running Out of Food in the Last Year: Often true     Ran Out of Food in the Last Year: Often true   Transportation Needs: Unmet Transportation Needs (3/31/2023)    Received from [facility]    PRAPARE - Transportation     Lack of Transportation (Medical): Yes     Lack of Transportation (Non-Medical): Yes   Physical Activity: Not on file   Stress: Not on file   Social Connections: Not on file   Intimate Partner Violence: Not At Risk (3/31/2023)    Received from [facility]    Humiliation, Afraid, Rape, and Kick questionnaire     Fear of Current or Ex-Partner: No     Emotionally Abused: No     Physically Abused: No     Sexually Abused: No   Housing Stability: High Risk (3/31/2023)    Received from [facility]    Housing Stability Vital Sign     Unable to Pay for Housing in the Last Year: Yes     Number of Places Lived in the Last Year: 1     Unstable Housing in the Last Year: Yes       Family History:     Family History   Problem Relation Age of Onset    Heart failure Mother     Diabetes Mother     Heart failure Father     Diabetes Father     Diabetes Brother       Current Medications:     Current Outpatient Medications   Medication Sig Dispense Refill    albuterol (ProAir HFA) 90 mcg/act inhaler Inhale 2 puffs every 6 (six) hours as needed for wheezing 8.5 g 0    ARIPiprazole (ABILIFY) 5 mg tablet Take 5 mg by mouth daily      aspirin (ECOTRIN LOW STRENGTH) 81 mg EC tablet Take 1 tablet (81 mg total) by mouth daily 90 tablet 1    atorvastatin (LIPITOR) 20 mg tablet Take 1 tablet (20 mg total) by mouth daily with dinner 90 tablet 1    Blood Glucose Monitoring Suppl (OneTouch Verio Reflect) w/Device KIT Check blood sugars four times daily. Please substitute with appropriate alternative as covered by patient's insurance. Dx: E11.65 1 kit 0    Continuous Blood Gluc Sensor (FreeStyle Soraya 3 Sensor) MISC Use 1 each every 14 (fourteen) days 6 each 3    diclofenac (VOLTAREN) 75 mg EC tablet Take 1 tablet (75 mg total) by mouth if needed (pain)      divalproex sodium (DEPAKOTE ER) 250 mg 24 hr tablet Take 1,000 mg by mouth daily      FeroSul 325 (65 Fe) MG tablet take 1 tablet by mouth once daily WITH BREAKFAST 30 tablet 5    fludrocortisone (FLORINEF) 0.1 mg tablet take 1 tablet by mouth twice a day 60 tablet 0    Insulin Glargine Solostar (Lantus SoloStar) 100 UNIT/ML SOPN Inject 0.46 mL (46 Units total) under the skin daily at bedtime      insulin lispro (HumaLOG) 100 units/mL injection pen Inject 12 Units under the skin 3 (three) times a day with meals 15 mL 3    Insulin Pen Needle (Pen Needles) 30G X 5 MM MISC Use 2 (two) times a day 100 each 2    methocarbamol (ROBAXIN) 500 mg tablet Take 1 tablet (500 mg total) by mouth 3 (three) times a day (Patient taking differently: Take 500 mg by mouth daily as needed) 90 tablet 1    metoprolol succinate (TOPROL-XL) 25 mg 24 hr tablet Take 1 tablet (25  "mg total) by mouth daily 30 tablet 5    mirtazapine (REMERON) 7.5 MG tablet Take 7.5 mg by mouth daily at bedtime      OneTouch Delica Lancets 33G MISC Check blood sugars four times daily. Please substitute with appropriate alternative as covered by patient's insurance. Dx: E11.65 400 each 3    pantoprazole (PROTONIX) 20 mg tablet take 1 tablet by mouth twice a day before meals 60 tablet 1    pregabalin (LYRICA) 150 mg capsule Take 1 capsule (150 mg total) by mouth 2 (two) times a day 180 capsule 1    topiramate (TOPAMAX) 50 MG tablet Take 1 tablet (50 mg total) by mouth 2 (two) times a day 60 tablet 2    traZODone (DESYREL) 100 mg tablet Take 200 mg by mouth daily at bedtime      venlafaxine (EFFEXOR-XR) 150 mg 24 hr capsule Take 1 capsule (150 mg total) by mouth daily      venlafaxine (EFFEXOR-XR) 75 mg 24 hr capsule Take 1 capsule (75 mg total) by mouth daily      levothyroxine 25 mcg tablet Take 1 tablet (25 mcg total) by mouth daily in the early morning 30 tablet 0     No current facility-administered medications for this visit.      Allergies:     Allergies   Allergen Reactions    [place] Oil - Food Allergy Anaphylaxis     Patient stated that she allergic to orange juice    Oxycodone-Acetaminophen Itching and Anaphylaxis    Orange (Diagnostic) - Food Allergy Hives and Itching    Orange Fruit [Citrus - Food Allergy] Hives     Pt states she has had since a child      Physical Exam:     /75 (BP Location: Right arm, Patient Position: Sitting, Cuff Size: Standard)   Pulse 85   Temp 97.7 °F (36.5 °C) (Tympanic)   Ht 5' 3\" (1.6 m)   Wt 102 kg (225 lb 1.4 oz)   LMP 02/15/2024   SpO2 98%   BMI 39.87 kg/m²     Physical Exam  Vitals reviewed.   Constitutional:       General: She is not in acute distress.     Appearance: She is obese. She is not ill-appearing.   HENT:      Head: Normocephalic and atraumatic.      Right Ear: External ear normal.      Left Ear: External ear normal.      Nose: Nose normal. "   Eyes:      Extraocular Movements: Extraocular movements intact.      Conjunctiva/sclera: Conjunctivae normal.   Neck:      Vascular: No carotid bruit.   Cardiovascular:      Rate and Rhythm: Normal rate and regular rhythm.      Pulses: Normal pulses.      Heart sounds: Normal heart sounds.   Pulmonary:      Effort: Pulmonary effort is normal.      Breath sounds: Normal breath sounds. No wheezing, rhonchi or rales.   Abdominal:      General: Abdomen is flat. Bowel sounds are normal.      Palpations: Abdomen is soft.   Musculoskeletal:      Cervical back: Neck supple.      Right lower leg: No edema.      Left lower leg: No edema.   Lymphadenopathy:      Cervical: No cervical adenopathy.   Skin:     General: Skin is warm.   Neurological:      General: No focal deficit present.      Mental Status: She is alert.   Psychiatric:         Mood and Affect: Mood normal.         Behavior: Behavior normal.         Thought Content: Thought content normal.          [facility]

## 2024-03-26 ENCOUNTER — APPOINTMENT (OUTPATIENT)
Dept: LAB | Facility: CLINIC | Age: 44
End: 2024-03-26
Payer: COMMERCIAL

## 2024-03-26 DIAGNOSIS — Z11.3 SCREENING FOR STD (SEXUALLY TRANSMITTED DISEASE): ICD-10-CM

## 2024-03-26 PROCEDURE — 87389 HIV-1 AG W/HIV-1&-2 AB AG IA: CPT

## 2024-03-26 PROCEDURE — 87491 CHLMYD TRACH DNA AMP PROBE: CPT

## 2024-03-26 PROCEDURE — 87591 N.GONORRHOEAE DNA AMP PROB: CPT

## 2024-03-26 PROCEDURE — 36415 COLL VENOUS BLD VENIPUNCTURE: CPT

## 2024-03-26 PROCEDURE — 86780 TREPONEMA PALLIDUM: CPT

## 2024-03-27 LAB
C TRACH DNA SPEC QL NAA+PROBE: NEGATIVE
HIV 1+2 AB+HIV1 P24 AG SERPL QL IA: NORMAL
HIV 2 AB SERPL QL IA: NORMAL
HIV1 AB SERPL QL IA: NORMAL
HIV1 P24 AG SERPL QL IA: NORMAL
N GONORRHOEA DNA SPEC QL NAA+PROBE: NEGATIVE
TREPONEMA PALLIDUM IGG+IGM AB [PRESENCE] IN SERUM OR PLASMA BY IMMUNOASSAY: NORMAL

## 2024-04-02 DIAGNOSIS — E03.9 HYPOTHYROID: ICD-10-CM

## 2024-04-02 RX ORDER — LEVOTHYROXINE SODIUM 0.03 MG/1
25 TABLET ORAL
Qty: 30 TABLET | Refills: 5 | Status: SHIPPED | OUTPATIENT
Start: 2024-04-02 | End: 2024-09-29

## 2024-04-04 DIAGNOSIS — R51.9 NONINTRACTABLE HEADACHE, UNSPECIFIED CHRONICITY PATTERN, UNSPECIFIED HEADACHE TYPE: ICD-10-CM

## 2024-04-04 DIAGNOSIS — R13.10 DYSPHAGIA: ICD-10-CM

## 2024-04-04 DIAGNOSIS — F33.9 RECURRENT MAJOR DEPRESSIVE DISORDER (HCC): ICD-10-CM

## 2024-04-04 RX ORDER — TOPIRAMATE 50 MG/1
50 TABLET, FILM COATED ORAL 2 TIMES DAILY
Qty: 60 TABLET | Refills: 2 | Status: SHIPPED | OUTPATIENT
Start: 2024-04-04

## 2024-04-04 RX ORDER — PANTOPRAZOLE SODIUM 20 MG/1
20 TABLET, DELAYED RELEASE ORAL
Qty: 60 TABLET | Refills: 1 | Status: SHIPPED | OUTPATIENT
Start: 2024-04-04

## 2024-05-01 ENCOUNTER — APPOINTMENT (OUTPATIENT)
Dept: LAB | Facility: CLINIC | Age: 44
End: 2024-05-01
Payer: COMMERCIAL

## 2024-05-01 DIAGNOSIS — F41.1 GENERALIZED ANXIETY DISORDER: ICD-10-CM

## 2024-05-01 DIAGNOSIS — F33.2 SEVERE RECURRENT MAJOR DEPRESSION WITHOUT PSYCHOTIC FEATURES (HCC): ICD-10-CM

## 2024-05-01 DIAGNOSIS — F43.10 POSTTRAUMATIC STRESS DISORDER: ICD-10-CM

## 2024-05-01 LAB — VALPROATE SERPL-MCNC: <10 UG/ML (ref 50–100)

## 2024-05-01 PROCEDURE — 36415 COLL VENOUS BLD VENIPUNCTURE: CPT

## 2024-05-01 PROCEDURE — 80164 ASSAY DIPROPYLACETIC ACD TOT: CPT

## 2024-05-24 DIAGNOSIS — E11.42 TYPE 2 DIABETES MELLITUS WITH DIABETIC POLYNEUROPATHY, WITH LONG-TERM CURRENT USE OF INSULIN (HCC): ICD-10-CM

## 2024-05-24 DIAGNOSIS — Z79.4 TYPE 2 DIABETES MELLITUS WITH DIABETIC POLYNEUROPATHY, WITH LONG-TERM CURRENT USE OF INSULIN (HCC): ICD-10-CM

## 2024-05-25 RX ORDER — INSULIN LISPRO 100 [IU]/ML
12 INJECTION, SOLUTION INTRAVENOUS; SUBCUTANEOUS
Qty: 15 ML | Refills: 0 | Status: SHIPPED | OUTPATIENT
Start: 2024-05-25

## 2024-06-12 DIAGNOSIS — R13.10 DYSPHAGIA: ICD-10-CM

## 2024-06-12 RX ORDER — PANTOPRAZOLE SODIUM 20 MG/1
20 TABLET, DELAYED RELEASE ORAL
Qty: 60 TABLET | Refills: 5 | Status: SHIPPED | OUTPATIENT
Start: 2024-06-12

## 2024-06-14 ENCOUNTER — TELEPHONE (OUTPATIENT)
Dept: FAMILY MEDICINE CLINIC | Facility: CLINIC | Age: 44
End: 2024-06-14

## 2024-06-14 DIAGNOSIS — Z00.6 ENCOUNTER FOR EXAMINATION FOR NORMAL COMPARISON OR CONTROL IN CLINICAL RESEARCH PROGRAM: ICD-10-CM

## 2024-06-14 NOTE — TELEPHONE ENCOUNTER
Attempted to reach Natacha to reschedule her appointment that she has with Darrian on the 24th of June due to her being on vacation. Patient didn't answer/ left a voicemail.

## 2024-06-18 ENCOUNTER — TELEPHONE (OUTPATIENT)
Dept: FAMILY MEDICINE CLINIC | Facility: CLINIC | Age: 44
End: 2024-06-18

## 2024-06-19 ENCOUNTER — CLINICAL SUPPORT (OUTPATIENT)
Dept: FAMILY MEDICINE CLINIC | Facility: CLINIC | Age: 44
End: 2024-06-19
Payer: COMMERCIAL

## 2024-06-19 DIAGNOSIS — E11.42 TYPE 2 DIABETES MELLITUS WITH DIABETIC POLYNEUROPATHY, WITH LONG-TERM CURRENT USE OF INSULIN (HCC): Primary | ICD-10-CM

## 2024-06-19 DIAGNOSIS — Z79.4 TYPE 2 DIABETES MELLITUS WITH DIABETIC POLYNEUROPATHY, WITH LONG-TERM CURRENT USE OF INSULIN (HCC): Primary | ICD-10-CM

## 2024-06-19 LAB
LEFT EYE DIABETIC RETINOPATHY: NORMAL
LEFT EYE IMAGE QUALITY: NORMAL
LEFT EYE MACULAR EDEMA: NORMAL
LEFT EYE OTHER RETINOPATHY: NORMAL
RIGHT EYE DIABETIC RETINOPATHY: NORMAL
RIGHT EYE IMAGE QUALITY: NORMAL
RIGHT EYE MACULAR EDEMA: NORMAL
RIGHT EYE OTHER RETINOPATHY: NORMAL
SEVERITY (EYE EXAM): NORMAL

## 2024-06-19 PROCEDURE — 92250 FUNDUS PHOTOGRAPHY W/I&R: CPT

## 2024-06-19 NOTE — PROGRESS NOTES
Patient came in for a Diabetic eye exam, had the patient in a dark room to naturally dilate her eyes. Patient eye dilated very well after being in a dark room for 3 minutes and was able to capture good photos of all 4 anatomy of the eye of the left and right eye.

## 2024-06-26 ENCOUNTER — APPOINTMENT (OUTPATIENT)
Dept: LAB | Facility: CLINIC | Age: 44
End: 2024-06-26

## 2024-06-26 DIAGNOSIS — Z00.6 ENCOUNTER FOR EXAMINATION FOR NORMAL COMPARISON OR CONTROL IN CLINICAL RESEARCH PROGRAM: ICD-10-CM

## 2024-06-26 PROCEDURE — 36415 COLL VENOUS BLD VENIPUNCTURE: CPT

## 2024-06-27 ENCOUNTER — OFFICE VISIT (OUTPATIENT)
Dept: FAMILY MEDICINE CLINIC | Facility: CLINIC | Age: 44
End: 2024-06-27
Payer: COMMERCIAL

## 2024-06-27 VITALS
SYSTOLIC BLOOD PRESSURE: 106 MMHG | BODY MASS INDEX: 39.29 KG/M2 | WEIGHT: 221.78 LBS | DIASTOLIC BLOOD PRESSURE: 63 MMHG | OXYGEN SATURATION: 98 % | HEART RATE: 80 BPM

## 2024-06-27 DIAGNOSIS — Z53.20 SCREENING MAMMOGRAPHY DECLINED: ICD-10-CM

## 2024-06-27 DIAGNOSIS — J45.20 MILD INTERMITTENT ASTHMA WITHOUT COMPLICATION: ICD-10-CM

## 2024-06-27 DIAGNOSIS — K21.9 GASTROESOPHAGEAL REFLUX DISEASE WITHOUT ESOPHAGITIS: ICD-10-CM

## 2024-06-27 DIAGNOSIS — D12.6 ADENOMATOUS POLYP OF COLON, UNSPECIFIED PART OF COLON: ICD-10-CM

## 2024-06-27 DIAGNOSIS — D50.8 IRON DEFICIENCY ANEMIA SECONDARY TO INADEQUATE DIETARY IRON INTAKE: ICD-10-CM

## 2024-06-27 DIAGNOSIS — F43.10 PTSD (POST-TRAUMATIC STRESS DISORDER): ICD-10-CM

## 2024-06-27 DIAGNOSIS — I95.1 ORTHOSTATIC HYPOTENSION: ICD-10-CM

## 2024-06-27 DIAGNOSIS — E03.9 HYPOTHYROIDISM, UNSPECIFIED TYPE: ICD-10-CM

## 2024-06-27 DIAGNOSIS — I25.10 CORONARY ARTERY DISEASE INVOLVING NATIVE CORONARY ARTERY OF NATIVE HEART WITHOUT ANGINA PECTORIS: ICD-10-CM

## 2024-06-27 DIAGNOSIS — R13.10 DYSPHAGIA, UNSPECIFIED TYPE: ICD-10-CM

## 2024-06-27 DIAGNOSIS — F33.2 MAJOR DEPRESSIVE DISORDER, RECURRENT SEVERE WITHOUT PSYCHOTIC FEATURES (HCC): ICD-10-CM

## 2024-06-27 DIAGNOSIS — Z79.4 TYPE 2 DIABETES MELLITUS WITH DIABETIC POLYNEUROPATHY, WITH LONG-TERM CURRENT USE OF INSULIN (HCC): Primary | ICD-10-CM

## 2024-06-27 DIAGNOSIS — Z59.82 TRANSPORTATION INSECURITY: ICD-10-CM

## 2024-06-27 DIAGNOSIS — G43.009 MIGRAINE WITHOUT AURA AND WITHOUT STATUS MIGRAINOSUS, NOT INTRACTABLE: ICD-10-CM

## 2024-06-27 DIAGNOSIS — M54.50 CHRONIC BILATERAL LOW BACK PAIN WITHOUT SCIATICA: ICD-10-CM

## 2024-06-27 DIAGNOSIS — E78.2 MIXED HYPERLIPIDEMIA: ICD-10-CM

## 2024-06-27 DIAGNOSIS — K58.1 IRRITABLE BOWEL SYNDROME WITH CONSTIPATION: ICD-10-CM

## 2024-06-27 DIAGNOSIS — G89.29 CHRONIC BILATERAL LOW BACK PAIN WITHOUT SCIATICA: ICD-10-CM

## 2024-06-27 DIAGNOSIS — M79.18 MYOFASCIAL PAIN SYNDROME: ICD-10-CM

## 2024-06-27 DIAGNOSIS — E11.42 TYPE 2 DIABETES MELLITUS WITH DIABETIC POLYNEUROPATHY, WITH LONG-TERM CURRENT USE OF INSULIN (HCC): Primary | ICD-10-CM

## 2024-06-27 PROCEDURE — 99214 OFFICE O/P EST MOD 30 MIN: CPT | Performed by: FAMILY MEDICINE

## 2024-06-27 RX ORDER — FLUDROCORTISONE ACETATE 0.1 MG/1
0.1 TABLET ORAL 2 TIMES DAILY
Qty: 180 TABLET | Refills: 1 | Status: SHIPPED | OUTPATIENT
Start: 2024-06-27

## 2024-06-27 SDOH — ECONOMIC STABILITY - TRANSPORTATION SECURITY: TRANSPORTATION INSECURITY: Z59.82

## 2024-06-27 NOTE — PROGRESS NOTES
Ambulatory Visit  Name: Rebecca Shaw      : 1980      MRN: [id number]  Encounter Provider: Genevieve Colmenares DO  Encounter Date: 2024   [facility]    Assessment & Plan   1. Type 2 diabetes mellitus with diabetic polyneuropathy, with long-term current use of insulin (HCC)  Assessment & Plan:    Lab Results   Component Value Date    HGBA1C 9.1 (H) 2024     Continue Insulin: 40u HS, mealtime 10u +SSI  Continue Lyrica 150mg BID for polyneuropathy and pain  Patient overdue for follow up with her podiatrist - encouraged her to schedule this  Due for Eye exam soon, patient to find new one locally and call if having trouble or needs a referral  Discussed the importance of sticking to a diabetic diet, however patient refuses to do this.  DM pharmacy and education referral placed though patient has concerns with adhering to this given trouble with transportation.   I messaged her endocrinology office to see if they can set up a virtual visit and assist with getting continuous glucose monitoring for the patient.   On Statin  Due for labs - previously ordered and rec she get them done soon  Will order her a new POC glucose kit for the interim.   She has appt with me in March, will follow up then.   2. Coronary artery disease involving native coronary artery of native heart without angina pectoris  Assessment & Plan:  Chronic, stable  No acute symptoms reported  Continue statin, beta-blocker, ASA  3. Myofascial pain syndrome  Assessment & Plan:  Continue Lyrica.  Will resume patient at previous dose of 150 twice a day.  Continue to monitor, and consider referral to spine/pain management at follow-up.  4. Major depressive disorder, recurrent severe without psychotic features (HCC)  Assessment & Plan:  Patient follows with psychiatry and therapist on a regular basis, and they manage her medications  Continue Abilify, Depakote, trazodone, Effexor  5.  Adenomatous polyp of colon, unspecified part of colon  Assessment & Plan:  Had colonoscopy in 2020 for monitoring of colon polyps. Prep was inadequate and she was to repeat scope a year later but has not done so yet. She moved to this area and needs a referral.   6. Hypothyroidism, unspecified type  Assessment & Plan:  Needs updated labs  Continue Levothyroxine 25mcg daily  7. Gastroesophageal reflux disease without esophagitis  Assessment & Plan:  Needs to re-est with GI  Continue PPI  8. Irritable bowel syndrome with constipation  Assessment & Plan:  Needs to re-est with GI  Colonoscopy unsuccessful 2020, overdue for repeat  9. Dysphagia, unspecified type  Assessment & Plan:  EGD 6/2020 Biopsies and brushings were negative.   Had esophageal manometry 7/22/21: Acid exposure time is 17.3%; DeMeester score is 66; 50 episodes of reflux with 48 in upright position and had to in recumbent position  All episodes were acid reflux episode  She has not followed up with GI and needs a new referral now that she moved to this area from [place].   Continue Protonix 20mg BID  10. Mild intermittent asthma without complication  Assessment & Plan:  Chronic, stable  Continue as needed albuterol  Advise vaping cessation  11. Migraine without aura and without status migrainosus, not intractable  Assessment & Plan:  Chronic, stable  Continue Topamax  12. Orthostatic hypotension  Assessment & Plan:  Patient is on Florinef due to history of hypotension, continue  Orders:  -     fludrocortisone (FLORINEF) 0.1 mg tablet; Take 1 tablet (0.1 mg total) by mouth 2 (two) times a day  13. Chronic bilateral low back pain without sciatica  Assessment & Plan:  Continue Lyrica 150 mg twice daily  Consider referral to spine/pain management at follow-up  14. Mixed hyperlipidemia  Assessment & Plan:  Lab Results   Component Value Date    CHOLESTEROL 135 02/12/2024    TRIG 192 (H) 02/12/2024    HDL 47 (L) 02/12/2024    LDLCALC 50 02/12/2024     At  goal  Continue atorvastatin  15. PTSD (post-traumatic stress disorder)  Assessment & Plan:  Patient follows closely with psychiatry and therapist  Continue current medications  16. Screening mammography declined  17. Iron deficiency anemia secondary to inadequate dietary iron intake  Assessment & Plan:  No recent labs available for review.  Will check CBC, and iron panel  Continue daily iron tab  18. Transportation insecurity  -     Ambulatory Referral to Social Work Care Management Program; Future       History of Present Illness   {Disappearing Hyperlinks I Encounters * My Last Note * Since Last Visit * History :79987}  HPI    Review of Systems  {Select to Display PMH (Optional):53641}  Objective   {Disappearing Hyperlinks   Review Vitals * Enter New Vitals * Results Review * Labs * Imaging * Cardiology * Procedures * Lung Cancer Screening :21983}  /63 (BP Location: Right arm, Patient Position: Sitting, Cuff Size: Standard)   Pulse 80   Wt 101 kg (221 lb 12.5 oz)   SpO2 98%   BMI 39.29 kg/m²     Physical Exam  Administrative Statements {Disappearing Hyperlinks I  Level of Service * PCMH/PCSP:83862}  {Time Spent Statement (Optional):44991}

## 2024-06-27 NOTE — ASSESSMENT & PLAN NOTE
Lab Results   Component Value Date    CHOLESTEROL 135 02/12/2024    TRIG 192 (H) 02/12/2024    HDL 47 (L) 02/12/2024    LDLCALC 50 02/12/2024     At goal  Continue atorvastatin

## 2024-06-27 NOTE — PROGRESS NOTES
Ambulatory Visit  Name: Rebecca Shaw      : 1980      MRN: [id number]  Encounter Provider: Genevieve Colmenares DO  Encounter Date: 2024   Encounter department: [facility] PRIMARY [facility]ARE    Assessment & Plan   1. Type 2 diabetes mellitus with diabetic polyneuropathy, with long-term current use of insulin (HCC)  Assessment & Plan:    Lab Results   Component Value Date    HGBA1C 9.1 (H) 2024   Has FS ronnie device - reports 90 day 8.4% average A1C  Continue Insulin: 45u HS, mealtime 12u +SSI  Continue Lyrica 150mg BID for polyneuropathy and pain  Patient overdue for follow up with her podiatrist - encouraged her to schedule this  DM eye exam [facility] - sightMD  Following with endo but struggles with transportation to make appts  On Statin  F/u 3 mo  2. Coronary artery disease involving native coronary artery of native heart without angina pectoris  Assessment & Plan:  Chronic, stable  No acute symptoms reported  Continue statin, beta-blocker, ASA  3. Myofascial pain syndrome  Assessment & Plan:  Continue Lyrica.  Will resume patient at previous dose of 150 twice a day.    4. Major depressive disorder, recurrent severe without psychotic features (HCC)  Assessment & Plan:  Patient follows with psychiatry and therapist on a regular basis, and they manage her medications  Continue Abilify, Depakote, trazodone, Effexor  5. Adenomatous polyp of colon, unspecified part of colon  Assessment & Plan:  Had colonoscopy in 2020 for monitoring of colon polyps. Prep was inadequate and she was to repeat scope a year later but has not done so yet. She moved to this area and needs a referral.  Referral previously placed but patient had to cancel the appointment due to difficulty with transportation.  6. Hypothyroidism, unspecified type  Assessment & Plan:  Labs 2024   TSH 3RD GENERATON  0.450 - 4.500 uIU/mL 2.612     Continue Levothyroxine 25mcg daily  7. Gastroesophageal reflux disease without  esophagitis  Assessment & Plan:  Needs to re-est with GI, referral previously placed  Continue PPI  8. Irritable bowel syndrome with constipation  Assessment & Plan:  Needs to re-est with GI  Colonoscopy unsuccessful 2020, overdue for repeat  9. Dysphagia, unspecified type  Assessment & Plan:  EGD 6/2020 Biopsies and brushings were negative.   Had esophageal manometry 7/22/21: Acid exposure time is 17.3%; DeMeester score is 66; 50 episodes of reflux with 48 in upright position and had to in recumbent position  All episodes were acid reflux episode  She has not followed up with GI and needs a new referral now that she moved to this area from [place].  Referral previously placed  Continue Protonix 20mg BID  10. Mild intermittent asthma without complication  Assessment & Plan:  Chronic, stable  Continue as needed albuterol  Advise vaping cessation  11. Migraine without aura and without status migrainosus, not intractable  Assessment & Plan:  Chronic, stable  Continue Topamax  12. Orthostatic hypotension  Assessment & Plan:  Patient is on Florinef due to history of hypotension, continue  Orders:  -     fludrocortisone (FLORINEF) 0.1 mg tablet; Take 1 tablet (0.1 mg total) by mouth 2 (two) times a day  13. Chronic bilateral low back pain without sciatica  Assessment & Plan:  Continue Lyrica 150 mg twice daily    14. Mixed hyperlipidemia  Assessment & Plan:  Lab Results   Component Value Date    CHOLESTEROL 135 02/12/2024    TRIG 192 (H) 02/12/2024    HDL 47 (L) 02/12/2024    LDLCALC 50 02/12/2024     At goal  Continue atorvastatin  15. PTSD (post-traumatic stress disorder)  Assessment & Plan:  Patient follows closely with psychiatry and therapist  Continue current medications  16. Screening mammography declined  17. Iron deficiency anemia secondary to inadequate dietary iron intake  Assessment & Plan:  CBC and iron panel within normal limits on 2/12/2024  Continue daily iron tab  18. Transportation insecurity  -      Ambulatory Referral to Social Work Care Management Program; Future         Return in about 3 months (around 9/27/2024) for Recheck chronic conditions .    History of Present Illness     HPI  Chief Complaint   Patient presents with    Follow-up     3 month follow up      Patient reports no acute concerns at this time.  Her main issue is needing a refill of her fludrocortisone.  The pharmacy told her they requested refill however they were sending these to her old PCP.  She has been out of the medication for a month.  I am placing a refill at this time and told her if she has issue with refills again in the future to please call the office.      Review of Systems   Constitutional:  Negative for appetite change, chills, diaphoresis, fever and unexpected weight change.   Eyes:  Negative for visual disturbance.   Respiratory:  Negative for shortness of breath.    Cardiovascular:  Negative for chest pain and leg swelling.   Gastrointestinal:  Negative for constipation and diarrhea.   Endocrine: Negative for polydipsia and polyuria.   Genitourinary:  Negative for frequency.   Neurological:  Negative for dizziness, light-headedness and headaches.     Current Outpatient Medications on File Prior to Visit   Medication Sig Dispense Refill    albuterol (ProAir HFA) 90 mcg/act inhaler Inhale 2 puffs every 6 (six) hours as needed for wheezing 8.5 g 0    ARIPiprazole (ABILIFY) 5 mg tablet Take 5 mg by mouth daily      aspirin (ECOTRIN LOW STRENGTH) 81 mg EC tablet Take 1 tablet (81 mg total) by mouth daily 90 tablet 1    atorvastatin (LIPITOR) 20 mg tablet Take 1 tablet (20 mg total) by mouth daily with dinner 90 tablet 1    Blood Glucose Monitoring Suppl (OneTouch Verio Reflect) w/Device KIT Check blood sugars four times daily. Please substitute with appropriate alternative as covered by patient's insurance. Dx: E11.65 1 kit 0    Continuous Blood Gluc Sensor (FreeStyle Soraya 3 Sensor) [facility] Use 1 each every 14 (fourteen) days 6  each 3    diclofenac (VOLTAREN) 75 mg EC tablet Take 1 tablet (75 mg total) by mouth if needed (pain)      divalproex sodium (DEPAKOTE ER) 250 mg 24 hr tablet Take 1,000 mg by mouth daily      FeroSul 325 (65 Fe) MG tablet take 1 tablet by mouth once daily WITH BREAKFAST 30 tablet 5    Insulin Glargine Solostar (Lantus SoloStar) 100 UNIT/ML SOPN Inject 0.46 mL (46 Units total) under the skin daily at bedtime      insulin lispro (HumaLOG) 100 units/mL injection pen Inject 12 Units under the skin 3 (three) times a day with meals 15 mL 0    Insulin Pen Needle (Pen Needles) 30G X 5 MM MISC Use 2 (two) times a day 100 each 2    levothyroxine 25 mcg tablet Take 1 tablet (25 mcg total) by mouth daily in the early morning 30 tablet 5    methocarbamol (ROBAXIN) 500 mg tablet Take 1 tablet (500 mg total) by mouth 3 (three) times a day (Patient taking differently: Take 500 mg by mouth daily as needed) 90 tablet 1    metoprolol succinate (TOPROL-XL) 25 mg 24 hr tablet Take 1 tablet (25 mg total) by mouth daily 30 tablet 5    mirtazapine (REMERON) 7.5 MG tablet Take 7.5 mg by mouth daily at bedtime      OneTouch Delica Lancets 33G MISC Check blood sugars four times daily. Please substitute with appropriate alternative as covered by patient's insurance. Dx: E11.65 400 each 3    pantoprazole (PROTONIX) 20 mg tablet take 1 tablet by mouth twice a day before meals 60 tablet 5    pregabalin (LYRICA) 150 mg capsule Take 1 capsule (150 mg total) by mouth 2 (two) times a day 180 capsule 1    topiramate (TOPAMAX) 50 MG tablet take 1 tablet by mouth twice a day 60 tablet 2    traZODone (DESYREL) 100 mg tablet Take 200 mg by mouth daily at bedtime      venlafaxine (EFFEXOR-XR) 150 mg 24 hr capsule Take 1 capsule (150 mg total) by mouth daily      venlafaxine (EFFEXOR-XR) 75 mg 24 hr capsule Take 1 capsule (75 mg total) by mouth daily      [DISCONTINUED] fludrocortisone (FLORINEF) 0.1 mg tablet take 1 tablet by mouth twice a day 60 tablet 0      No current facility-administered medications on file prior to visit.      Social History     Tobacco Use    Smoking status: Former     Current packs/day: 0.00     Types: Cigarettes     Quit date: 2018     Years since quittin.6    Smokeless tobacco: Never    Tobacco comments:     occassionally   Vaping Use    Vaping status: Every Day    Substances: Nicotine, Flavoring   Substance and Sexual Activity    Alcohol use: Yes     Comment: occasional    Drug use: No    Sexual activity: Yes     Partners: Male     Objective     /63 (BP Location: Right arm, Patient Position: Sitting, Cuff Size: Standard)   Pulse 80   Wt 101 kg (221 lb 12.5 oz)   SpO2 98%   BMI 39.29 kg/m²     Physical Exam  Vitals reviewed.   Constitutional:       General: She is not in acute distress.     Appearance: She is obese. She is not ill-appearing.   HENT:      Head: Normocephalic and atraumatic.      Right Ear: External ear normal.      Left Ear: External ear normal.      Nose: Nose normal.   Eyes:      Extraocular Movements: Extraocular movements intact.      Conjunctiva/sclera: Conjunctivae normal.   Neck:      Vascular: No carotid bruit.   Cardiovascular:      Rate and Rhythm: Normal rate and regular rhythm.      Pulses: Normal pulses.      Heart sounds: Normal heart sounds.   Pulmonary:      Effort: Pulmonary effort is normal.      Breath sounds: Normal breath sounds. No wheezing, rhonchi or rales.   Abdominal:      General: Abdomen is flat. Bowel sounds are normal.      Palpations: Abdomen is soft.   Musculoskeletal:      Cervical back: Neck supple.      Right lower leg: No edema.      Left lower leg: No edema.   Lymphadenopathy:      Cervical: No cervical adenopathy.   Skin:     General: Skin is warm.   Neurological:      General: No focal deficit present.      Mental Status: She is alert.   Psychiatric:         Mood and Affect: Mood normal.         Behavior: Behavior normal.         Thought Content: Thought content normal.        Administrative Statements

## 2024-06-27 NOTE — ASSESSMENT & PLAN NOTE
Labs 2/12/2024   TSH 3RD GENERATON  0.450 - 4.500 uIU/mL 2.612     Continue Levothyroxine 25mcg daily

## 2024-06-27 NOTE — ASSESSMENT & PLAN NOTE
Lab Results   Component Value Date    HGBA1C 9.1 (H) 02/12/2024   Has FS ronnie device - reports 90 day 8.4% average A1C  Continue Insulin: 45u HS, mealtime 12u +SSI  Continue Lyrica 150mg BID for polyneuropathy and pain  Patient overdue for follow up with her podiatrist - encouraged her to schedule this  DM eye exam [facility] - sightMD  Following with endo but struggles with transportation to make appts  On Statin  F/u 3 mo

## 2024-06-27 NOTE — ASSESSMENT & PLAN NOTE
EGD 6/2020 Biopsies and brushings were negative.   Had esophageal manometry 7/22/21: Acid exposure time is 17.3%; DeMeester score is 66; 50 episodes of reflux with 48 in upright position and had to in recumbent position  All episodes were acid reflux episode  She has not followed up with GI and needs a new referral now that she moved to this area from [place].  Referral previously placed  Continue Protonix 20mg BID

## 2024-06-27 NOTE — ASSESSMENT & PLAN NOTE
Had colonoscopy in 2020 for monitoring of colon polyps. Prep was inadequate and she was to repeat scope a year later but has not done so yet. She moved to this area and needs a referral.  Referral previously placed but patient had to cancel the appointment due to difficulty with transportation.

## 2024-06-28 ENCOUNTER — PATIENT OUTREACH (OUTPATIENT)
Dept: FAMILY MEDICINE CLINIC | Facility: CLINIC | Age: 44
End: 2024-06-28

## 2024-06-28 NOTE — PROGRESS NOTES
Covering MARI CM received a referral from patient's PCP regarding patient's need for social work follow up due to transportation barrier. MARI CM attempted to contact patient at this time. LVM requesting a call in return at patient's earliest convenience.Will continue attempts to reach patient.

## 2024-07-01 ENCOUNTER — PATIENT OUTREACH (OUTPATIENT)
Dept: FAMILY MEDICINE CLINIC | Facility: CLINIC | Age: 44
End: 2024-07-01

## 2024-07-01 DIAGNOSIS — Z59.82 TRANSPORTATION INSECURITY: Primary | ICD-10-CM

## 2024-07-01 SDOH — ECONOMIC STABILITY - TRANSPORTATION SECURITY: TRANSPORTATION INSECURITY: Z59.82

## 2024-07-01 NOTE — PROGRESS NOTES
OP CM called to pt in regards to referral for transportation.  Pt answered the phone and states she sometimes needs transportation.  Pt states that she tried to apply for STS but did not understand the application.      Pt resides alone.  Pt states that she is on housing and gets food stamps.  Pt confirms that her son Greg is her emergency contact.      Pt has a psychiatrist and therapist through [facility] and a peer specialist from [facility] [place].  Pt states her peer specialist takes her to events in the community and she also helps her accomplish goals.

## 2024-07-03 ENCOUNTER — PATIENT OUTREACH (OUTPATIENT)
Dept: FAMILY MEDICINE CLINIC | Facility: CLINIC | Age: 44
End: 2024-07-03

## 2024-07-03 NOTE — PROGRESS NOTES
CMOC received a referral from TOBI Goodman to assist patient with applying for transportation services.     CMOC attempted to contact Natacha to discuss the referral.   No answer. Left message on voicemail with reason for call, this writer's contact information, and a request for a call back to assist.     Will outreach next week if no contact prior.

## 2024-07-10 ENCOUNTER — PATIENT OUTREACH (OUTPATIENT)
Dept: FAMILY MEDICINE CLINIC | Facility: CLINIC | Age: 44
End: 2024-07-10

## 2024-07-10 NOTE — LETTER
07/10/24    Dear Rebecca Shaw,    I am a Community Health Worker with [facility].    I have made several attempts to call you by phone.  If you would like help with applying for transportation services, please contact me at [phone] so I may further assist you.      Sincerely,     Jasmina Casper      
normal...

## 2024-07-10 NOTE — PROGRESS NOTES
CMOC attempted to contact Natacha to discuss applying for transportation services.     No answer. Left message on voicemail with reason for call, this writer's contact information, and a request for a call back to assist if desired.     Unable to reach letter sent to patient's MyChart.     Referral will be closed at this time. CMOC is available if patient reaches out or for any future needs.     No further outreaches will be made.

## 2024-07-11 ENCOUNTER — PATIENT OUTREACH (OUTPATIENT)
Dept: FAMILY MEDICINE CLINIC | Facility: CLINIC | Age: 44
End: 2024-07-11

## 2024-07-11 NOTE — PROGRESS NOTES
OP CM rcvd inbasket from [facility] that several attempts were made to contact pt to assist with transportation.  Pt has not responded.  Case will be closed at this time.

## 2024-07-14 DIAGNOSIS — I25.10 CAD (CORONARY ARTERY DISEASE): ICD-10-CM

## 2024-07-14 RX ORDER — ASPIRIN 81 MG/1
81 TABLET, COATED ORAL DAILY
Qty: 100 TABLET | Refills: 1 | Status: SHIPPED | OUTPATIENT
Start: 2024-07-14

## 2024-07-14 RX ORDER — ATORVASTATIN CALCIUM 20 MG/1
20 TABLET, FILM COATED ORAL
Qty: 100 TABLET | Refills: 1 | Status: SHIPPED | OUTPATIENT
Start: 2024-07-14

## 2024-07-15 DIAGNOSIS — R51.9 NONINTRACTABLE HEADACHE, UNSPECIFIED CHRONICITY PATTERN, UNSPECIFIED HEADACHE TYPE: ICD-10-CM

## 2024-07-15 DIAGNOSIS — F33.9 RECURRENT MAJOR DEPRESSIVE DISORDER (HCC): ICD-10-CM

## 2024-07-16 RX ORDER — TOPIRAMATE 50 MG/1
50 TABLET, FILM COATED ORAL 2 TIMES DAILY
Qty: 180 TABLET | Refills: 1 | Status: SHIPPED | OUTPATIENT
Start: 2024-07-16

## 2024-08-09 ENCOUNTER — OFFICE VISIT (OUTPATIENT)
Dept: FAMILY MEDICINE CLINIC | Facility: CLINIC | Age: 44
End: 2024-08-09
Payer: COMMERCIAL

## 2024-08-09 VITALS
SYSTOLIC BLOOD PRESSURE: 109 MMHG | DIASTOLIC BLOOD PRESSURE: 56 MMHG | HEART RATE: 87 BPM | OXYGEN SATURATION: 97 % | BODY MASS INDEX: 39.72 KG/M2 | WEIGHT: 224.21 LBS

## 2024-08-09 DIAGNOSIS — B96.89 ACUTE BACTERIAL SINUSITIS: Primary | ICD-10-CM

## 2024-08-09 DIAGNOSIS — J01.90 ACUTE BACTERIAL SINUSITIS: Primary | ICD-10-CM

## 2024-08-09 PROCEDURE — 99213 OFFICE O/P EST LOW 20 MIN: CPT | Performed by: FAMILY MEDICINE

## 2024-08-09 RX ORDER — AMOXICILLIN AND CLAVULANATE POTASSIUM 875; 125 MG/1; MG/1
1 TABLET, FILM COATED ORAL EVERY 12 HOURS SCHEDULED
Qty: 14 TABLET | Refills: 0 | Status: SHIPPED | OUTPATIENT
Start: 2024-08-09 | End: 2024-08-16

## 2024-08-09 NOTE — PROGRESS NOTES
Ambulatory Visit  Name: Rebecca Shaw      : 1980      MRN: [id number]  Encounter Provider: Genevieve Colmenares DO  Encounter Date: 2024   Encounter department: [facility] PRIMARY [facility]ARE    Assessment & Plan   1. Acute bacterial sinusitis  -     amoxicillin-clavulanate (AUGMENTIN) 875-125 mg per tablet; Take 1 tablet by mouth every 12 (twelve) hours for 7 days  Patient with suspected bacterial sinusitis given history and PE and duration of symptoms. Recommend patient begin course of Abx as above and continue with OTC and home remedies, handout provided. Discussed expected course of the infection and rec to follow up if symptoms worsen or do not improve.     Return if symptoms worsen or fail to improve.       History of Present Illness     Chief Complaint   Patient presents with    Earache    Sore Throat    Headache     Sinus pressure     Patient started feeling sick about a week ago and continues to get worse. She has a lot of sinus pressure, headaches, ear pain bilaterally. She is fatigued. She has tried tylenol for pain, ibuprofen for the pain. She has also tried sudafed without much improvement overall, only temp relief. She is also taking daily allegra.   She reports no changes in appetite. She continues to monitor her diabetes. She plans to schedule a follow up with endo for this.     Below is information patient completed online today prior to the visit. This was reviewed as well.     Earache   There is pain in the left ear. This is a new problem. The current episode started in the past 7 days. The problem occurs constantly. The problem has been rapidly worsening. There has been no fever. The pain is at a severity of 7/10. Associated symptoms include ear discharge, headaches, neck pain and a sore throat. Pertinent negatives include no abdominal pain, coughing, diarrhea, hearing loss, rash, rhinorrhea or vomiting.       Review of Systems   HENT:  Positive for ear discharge, ear  pain and sore throat. Negative for hearing loss and rhinorrhea.    Respiratory:  Negative for cough.    Gastrointestinal:  Negative for abdominal pain, diarrhea and vomiting.   Musculoskeletal:  Positive for neck pain.   Skin:  Negative for rash.   Neurological:  Positive for headaches.     Current Outpatient Medications on File Prior to Visit   Medication Sig Dispense Refill    albuterol (ProAir HFA) 90 mcg/act inhaler Inhale 2 puffs every 6 (six) hours as needed for wheezing 8.5 g 0    ARIPiprazole (ABILIFY) 5 mg tablet Take 5 mg by mouth daily      aspirin (Aspirin Low Dose) 81 mg EC tablet TAKE 1 TAB BY MOUTH DAILY 100 tablet 1    atorvastatin (LIPITOR) 20 mg tablet take 1 tablet by mouth daily with dinner 100 tablet 1    Blood Glucose Monitoring Suppl (OneTouch Verio Reflect) w/Device KIT Check blood sugars four times daily. Please substitute with appropriate alternative as covered by patient's insurance. Dx: E11.65 1 kit 0    Continuous Blood Gluc Sensor (FreeStyle Soraya 3 Sensor) MISC Use 1 each every 14 (fourteen) days 6 each 3    diclofenac (VOLTAREN) 75 mg EC tablet Take 1 tablet (75 mg total) by mouth if needed (pain)      divalproex sodium (DEPAKOTE ER) 250 mg 24 hr tablet Take 1,000 mg by mouth daily      FeroSul 325 (65 Fe) MG tablet take 1 tablet by mouth once daily WITH BREAKFAST 30 tablet 5    fludrocortisone (FLORINEF) 0.1 mg tablet Take 1 tablet (0.1 mg total) by mouth 2 (two) times a day 180 tablet 1    Insulin Glargine Solostar (Lantus SoloStar) 100 UNIT/ML SOPN Inject 0.46 mL (46 Units total) under the skin daily at bedtime      insulin lispro (HumaLOG) 100 units/mL injection pen Inject 12 Units under the skin 3 (three) times a day with meals 15 mL 0    Insulin Pen Needle (Pen Needles) 30G X 5 MM MISC Use 2 (two) times a day 100 each 2    levothyroxine 25 mcg tablet Take 1 tablet (25 mcg total) by mouth daily in the early morning 30 tablet 5    methocarbamol (ROBAXIN) 500 mg tablet Take 1  tablet (500 mg total) by mouth 3 (three) times a day (Patient taking differently: Take 500 mg by mouth daily as needed) 90 tablet 1    metoprolol succinate (TOPROL-XL) 25 mg 24 hr tablet Take 1 tablet (25 mg total) by mouth daily 30 tablet 5    mirtazapine (REMERON) 7.5 MG tablet Take 7.5 mg by mouth daily at bedtime      OneTouch Delica Lancets 33G MISC Check blood sugars four times daily. Please substitute with appropriate alternative as covered by patient's insurance. Dx: E11.65 400 each 3    pantoprazole (PROTONIX) 20 mg tablet take 1 tablet by mouth twice a day before meals 60 tablet 5    pregabalin (LYRICA) 150 mg capsule Take 1 capsule (150 mg total) by mouth 2 (two) times a day 180 capsule 1    topiramate (TOPAMAX) 50 MG tablet take 1 tablet by mouth twice a day 180 tablet 1    traZODone (DESYREL) 100 mg tablet Take 200 mg by mouth daily at bedtime      venlafaxine (EFFEXOR-XR) 150 mg 24 hr capsule Take 1 capsule (150 mg total) by mouth daily      venlafaxine (EFFEXOR-XR) 75 mg 24 hr capsule Take 1 capsule (75 mg total) by mouth daily       No current facility-administered medications on file prior to visit.      Social History     Tobacco Use    Smoking status: Former     Current packs/day: 0.00     Types: Cigarettes     Quit date: 2018     Years since quittin.7    Smokeless tobacco: Never    Tobacco comments:     occassionally   Vaping Use    Vaping status: Every Day    Substances: Nicotine, Flavoring   Substance and Sexual Activity    Alcohol use: Yes     Comment: occasional    Drug use: No    Sexual activity: Yes     Partners: Male     Objective     /56 (BP Location: Right arm, Patient Position: Sitting, Cuff Size: Large)   Pulse 87   Wt 102 kg (224 lb 3.3 oz)   SpO2 97%   BMI 39.72 kg/m²     Physical Exam  Vitals reviewed.   Constitutional:       General: She is not in acute distress.     Appearance: She is obese. She is not ill-appearing.   HENT:      Head: Normocephalic and  atraumatic.      Right Ear: Ear canal normal. Tenderness present. No drainage or swelling. A middle ear effusion is present. Tympanic membrane is not erythematous.      Left Ear: Tympanic membrane and ear canal normal. No drainage, swelling or tenderness.  No middle ear effusion. Tympanic membrane is not erythematous.      Nose: Congestion present. No rhinorrhea.      Right Sinus: Maxillary sinus tenderness present.      Left Sinus: Maxillary sinus tenderness present.      Mouth/Throat:      Mouth: Mucous membranes are moist.      Pharynx: Oropharynx is clear. No pharyngeal swelling or oropharyngeal exudate.      Tonsils: No tonsillar exudate.   Eyes:      Conjunctiva/sclera: Conjunctivae normal.   Cardiovascular:      Rate and Rhythm: Normal rate and regular rhythm.      Heart sounds: Normal heart sounds.   Pulmonary:      Effort: Pulmonary effort is normal.      Breath sounds: Normal breath sounds. No wheezing, rhonchi or rales.   Musculoskeletal:      Cervical back: Neck supple.   Lymphadenopathy:      Cervical: Cervical adenopathy present.   Skin:     General: Skin is warm.   Neurological:      General: No focal deficit present.      Mental Status: She is alert.   Psychiatric:         Mood and Affect: Mood normal.         Behavior: Behavior normal.

## 2024-08-29 DIAGNOSIS — R00.2 HEART PALPITATIONS: ICD-10-CM

## 2024-08-29 RX ORDER — METOPROLOL SUCCINATE 25 MG/1
25 TABLET, EXTENDED RELEASE ORAL DAILY
Qty: 30 TABLET | Refills: 5 | Status: SHIPPED | OUTPATIENT
Start: 2024-08-29

## 2024-09-09 DIAGNOSIS — F33.9 RECURRENT MAJOR DEPRESSIVE DISORDER (HCC): ICD-10-CM

## 2024-09-09 DIAGNOSIS — M79.18 MYOFASCIAL PAIN SYNDROME: ICD-10-CM

## 2024-09-09 DIAGNOSIS — I95.1 ORTHOSTATIC HYPOTENSION: ICD-10-CM

## 2024-09-09 DIAGNOSIS — R51.9 NONINTRACTABLE HEADACHE, UNSPECIFIED CHRONICITY PATTERN, UNSPECIFIED HEADACHE TYPE: ICD-10-CM

## 2024-09-09 DIAGNOSIS — R13.10 DYSPHAGIA: ICD-10-CM

## 2024-09-10 RX ORDER — FLUDROCORTISONE ACETATE 0.1 MG/1
0.1 TABLET ORAL 2 TIMES DAILY
Qty: 180 TABLET | Refills: 0 | Status: SHIPPED | OUTPATIENT
Start: 2024-09-10

## 2024-09-10 RX ORDER — PREGABALIN 150 MG/1
150 CAPSULE ORAL 2 TIMES DAILY
Qty: 180 CAPSULE | Refills: 0 | Status: SHIPPED | OUTPATIENT
Start: 2024-09-10

## 2024-09-10 RX ORDER — TOPIRAMATE 50 MG/1
50 TABLET, FILM COATED ORAL 2 TIMES DAILY
Qty: 180 TABLET | Refills: 0 | Status: SHIPPED | OUTPATIENT
Start: 2024-09-10

## 2024-09-10 RX ORDER — PANTOPRAZOLE SODIUM 20 MG/1
20 TABLET, DELAYED RELEASE ORAL
Qty: 60 TABLET | Refills: 0 | Status: SHIPPED | OUTPATIENT
Start: 2024-09-10

## 2024-10-01 DIAGNOSIS — R13.10 DYSPHAGIA: ICD-10-CM

## 2024-10-01 RX ORDER — PANTOPRAZOLE SODIUM 20 MG/1
20 TABLET, DELAYED RELEASE ORAL
Qty: 60 TABLET | Refills: 5 | Status: SHIPPED | OUTPATIENT
Start: 2024-10-01

## 2024-10-04 DIAGNOSIS — R00.2 HEART PALPITATIONS: ICD-10-CM

## 2024-10-04 RX ORDER — METOPROLOL SUCCINATE 25 MG/1
25 TABLET, EXTENDED RELEASE ORAL DAILY
Qty: 90 TABLET | Refills: 1 | Status: SHIPPED | OUTPATIENT
Start: 2024-10-04

## 2024-10-08 ENCOUNTER — APPOINTMENT (OUTPATIENT)
Dept: LAB | Facility: CLINIC | Age: 44
End: 2024-10-08
Payer: COMMERCIAL

## 2024-10-08 DIAGNOSIS — F33.2 SEVERE RECURRENT MAJOR DEPRESSION WITHOUT PSYCHOTIC FEATURES (HCC): ICD-10-CM

## 2024-10-08 DIAGNOSIS — F41.1 GENERALIZED ANXIETY DISORDER: ICD-10-CM

## 2024-10-08 DIAGNOSIS — F43.10 POSTTRAUMATIC STRESS DISORDER: ICD-10-CM

## 2024-10-08 LAB
BASOPHILS # BLD AUTO: 0.04 THOUSANDS/ΜL (ref 0–0.1)
BASOPHILS NFR BLD AUTO: 1 % (ref 0–1)
CHOLEST SERPL-MCNC: 109 MG/DL
EOSINOPHIL # BLD AUTO: 0.18 THOUSAND/ΜL (ref 0–0.61)
EOSINOPHIL NFR BLD AUTO: 3 % (ref 0–6)
ERYTHROCYTE [DISTWIDTH] IN BLOOD BY AUTOMATED COUNT: 12.6 % (ref 11.6–15.1)
EST. AVERAGE GLUCOSE BLD GHB EST-MCNC: 243 MG/DL
HBA1C MFR BLD: 10.1 %
HCT VFR BLD AUTO: 41.9 % (ref 34.8–46.1)
HDLC SERPL-MCNC: 39 MG/DL
HGB BLD-MCNC: 14.2 G/DL (ref 11.5–15.4)
IMM GRANULOCYTES # BLD AUTO: 0.03 THOUSAND/UL (ref 0–0.2)
IMM GRANULOCYTES NFR BLD AUTO: 0 % (ref 0–2)
LDLC SERPL CALC-MCNC: 26 MG/DL (ref 0–100)
LYMPHOCYTES # BLD AUTO: 3.08 THOUSANDS/ΜL (ref 0.6–4.47)
LYMPHOCYTES NFR BLD AUTO: 42 % (ref 14–44)
MCH RBC QN AUTO: 31.6 PG (ref 26.8–34.3)
MCHC RBC AUTO-ENTMCNC: 33.9 G/DL (ref 31.4–37.4)
MCV RBC AUTO: 93 FL (ref 82–98)
MONOCYTES # BLD AUTO: 0.43 THOUSAND/ΜL (ref 0.17–1.22)
MONOCYTES NFR BLD AUTO: 6 % (ref 4–12)
NEUTROPHILS # BLD AUTO: 3.51 THOUSANDS/ΜL (ref 1.85–7.62)
NEUTS SEG NFR BLD AUTO: 48 % (ref 43–75)
NONHDLC SERPL-MCNC: 70 MG/DL
NRBC BLD AUTO-RTO: 0 /100 WBCS
PLATELET # BLD AUTO: 212 THOUSANDS/UL (ref 149–390)
PMV BLD AUTO: 11.9 FL (ref 8.9–12.7)
RBC # BLD AUTO: 4.49 MILLION/UL (ref 3.81–5.12)
T3 SERPL-MCNC: 1.3 NG/ML
T4 SERPL-MCNC: 7.36 UG/DL (ref 6.09–12.23)
TRIGL SERPL-MCNC: 219 MG/DL
TSH SERPL DL<=0.05 MIU/L-ACNC: 1.93 UIU/ML (ref 0.45–4.5)
VALPROATE SERPL-MCNC: 51 UG/ML (ref 50–100)
WBC # BLD AUTO: 7.27 THOUSAND/UL (ref 4.31–10.16)

## 2024-10-08 PROCEDURE — 84480 ASSAY TRIIODOTHYRONINE (T3): CPT

## 2024-10-08 PROCEDURE — 80164 ASSAY DIPROPYLACETIC ACD TOT: CPT

## 2024-10-08 PROCEDURE — 85025 COMPLETE CBC W/AUTO DIFF WBC: CPT

## 2024-10-08 PROCEDURE — 83036 HEMOGLOBIN GLYCOSYLATED A1C: CPT

## 2024-10-08 PROCEDURE — 80061 LIPID PANEL: CPT

## 2024-10-08 PROCEDURE — 84443 ASSAY THYROID STIM HORMONE: CPT

## 2024-10-08 PROCEDURE — 84436 ASSAY OF TOTAL THYROXINE: CPT

## 2024-10-08 PROCEDURE — 36415 COLL VENOUS BLD VENIPUNCTURE: CPT

## 2024-10-11 LAB
APOB+LDLR+PCSK9 GENE MUT ANL BLD/T: NOT DETECTED
BRCA1+BRCA2 DEL+DUP + FULL MUT ANL BLD/T: NOT DETECTED
MLH1+MSH2+MSH6+PMS2 GN DEL+DUP+FUL M: NOT DETECTED

## 2024-10-16 DIAGNOSIS — Z79.4 TYPE 2 DIABETES MELLITUS WITH DIABETIC POLYNEUROPATHY, WITH LONG-TERM CURRENT USE OF INSULIN (HCC): ICD-10-CM

## 2024-10-16 DIAGNOSIS — E11.42 TYPE 2 DIABETES MELLITUS WITH DIABETIC POLYNEUROPATHY, WITH LONG-TERM CURRENT USE OF INSULIN (HCC): ICD-10-CM

## 2024-10-16 RX ORDER — INSULIN LISPRO 100 [IU]/ML
INJECTION, SOLUTION INTRAVENOUS; SUBCUTANEOUS
Qty: 15 ML | Refills: 0 | Status: SHIPPED | OUTPATIENT
Start: 2024-10-16

## 2024-10-16 NOTE — TELEPHONE ENCOUNTER
Patient called to request a refill for their Humalog Pen advised a refill was requested on 10/16/24 and is pending approval. Patient verbalized understanding and is in agreement.       Patient is out of medication.

## 2024-11-29 DIAGNOSIS — R51.9 NONINTRACTABLE HEADACHE, UNSPECIFIED CHRONICITY PATTERN, UNSPECIFIED HEADACHE TYPE: ICD-10-CM

## 2024-11-29 DIAGNOSIS — F33.9 RECURRENT MAJOR DEPRESSIVE DISORDER (HCC): ICD-10-CM

## 2024-11-29 RX ORDER — TOPIRAMATE 50 MG/1
50 TABLET, FILM COATED ORAL 2 TIMES DAILY
Qty: 180 TABLET | Refills: 1 | Status: SHIPPED | OUTPATIENT
Start: 2024-11-29

## 2024-12-06 DIAGNOSIS — I95.1 ORTHOSTATIC HYPOTENSION: ICD-10-CM

## 2024-12-06 RX ORDER — FLUDROCORTISONE ACETATE 0.1 MG/1
0.1 TABLET ORAL 2 TIMES DAILY
Qty: 180 TABLET | Refills: 0 | Status: SHIPPED | OUTPATIENT
Start: 2024-12-06

## 2025-02-14 ENCOUNTER — OFFICE VISIT (OUTPATIENT)
Dept: ENDOCRINOLOGY | Facility: CLINIC | Age: 45
End: 2025-02-14
Payer: COMMERCIAL

## 2025-02-14 VITALS
DIASTOLIC BLOOD PRESSURE: 68 MMHG | SYSTOLIC BLOOD PRESSURE: 110 MMHG | HEIGHT: 63 IN | BODY MASS INDEX: 39.51 KG/M2 | WEIGHT: 223 LBS | TEMPERATURE: 96.6 F

## 2025-02-14 DIAGNOSIS — Z79.4 TYPE 2 DIABETES MELLITUS WITH DIABETIC POLYNEUROPATHY, WITH LONG-TERM CURRENT USE OF INSULIN (HCC): Primary | ICD-10-CM

## 2025-02-14 DIAGNOSIS — E11.42 TYPE 2 DIABETES MELLITUS WITH DIABETIC POLYNEUROPATHY, WITH LONG-TERM CURRENT USE OF INSULIN (HCC): Primary | ICD-10-CM

## 2025-02-14 DIAGNOSIS — E03.9 HYPOTHYROIDISM, UNSPECIFIED TYPE: ICD-10-CM

## 2025-02-14 DIAGNOSIS — E78.2 MIXED HYPERLIPIDEMIA: ICD-10-CM

## 2025-02-14 PROCEDURE — 99214 OFFICE O/P EST MOD 30 MIN: CPT | Performed by: PHYSICIAN ASSISTANT

## 2025-02-14 RX ORDER — INSULIN GLARGINE 100 [IU]/ML
46 INJECTION, SOLUTION SUBCUTANEOUS
Qty: 54 ML | Refills: 3 | Status: SHIPPED | OUTPATIENT
Start: 2025-02-14

## 2025-02-14 RX ORDER — LEVOTHYROXINE SODIUM 25 UG/1
25 TABLET ORAL DAILY
Qty: 90 TABLET | Refills: 3 | Status: SHIPPED | OUTPATIENT
Start: 2025-02-14

## 2025-02-14 NOTE — ASSESSMENT & PLAN NOTE
Recent Thyroid labs stable. To continue LT4 at 25mcg daily.     Orders:    levothyroxine (Euthyrox) 25 mcg tablet; Take 1 tablet (25 mcg total) by mouth daily

## 2025-02-14 NOTE — ASSESSMENT & PLAN NOTE
Lab Results   Component Value Date    HGBA1C 10.1 (H) 10/08/2024   Poorly controlled DM in setting of lack of insulin supply. To restart Lantus at 46 units daily.   Would benefit from GLP1 use. MOA and potential AE discussed. To start Ozempic 0.25mg weekly x 4 weeks, then plan to increase to 0.5mg weekly thereafer if well tolerated. Sample provided.    BG monitoring - to continue CGM use in setting of insulin use.     Follow up - 2 months, with routine DM labs to be obtained prior to visit.      Orders:    semaglutide, 0.25 or 0.5 mg/dose, (Ozempic, 0.25 or 0.5 MG/DOSE,) 2 mg/3 mL injection pen; Inject 0.75 mL (0.5 mg total) under the skin every 7 days    Insulin Glargine Solostar (Lantus SoloStar) 100 UNIT/ML SOPN; Inject 0.46 mL (46 Units total) under the skin daily at bedtime Or as directed, TDD up to 60 units daily    Albumin / creatinine urine ratio; Future    Lipid panel; Future    Hemoglobin A1C; Future    Vitamin D 25 hydroxy; Future    Comprehensive metabolic panel; Future

## 2025-02-14 NOTE — PROGRESS NOTES
Name: Rebecca Shaw      : 1980      MRN: [id number]  Encounter Provider: Hilary Leo PA-C  Encounter Date: 2025   Encounter department: [facility]  :  Assessment & Plan  Type 2 diabetes mellitus with diabetic polyneuropathy, with long-term current use of insulin (HCC)    Lab Results   Component Value Date    HGBA1C 10.1 (H) 10/08/2024   Poorly controlled DM in setting of lack of insulin supply. To restart Lantus at 46 units daily.   Would benefit from GLP1 use. MOA and potential AE discussed. To start Ozempic 0.25mg weekly x 4 weeks, then plan to increase to 0.5mg weekly thereafer if well tolerated. Sample provided.    BG monitoring - to continue CGM use in setting of insulin use.     Follow up - 2 months, with routine DM labs to be obtained prior to visit.      Orders:    semaglutide, 0.25 or 0.5 mg/dose, (Ozempic, 0.25 or 0.5 MG/DOSE,) 2 mg/3 mL injection pen; Inject 0.75 mL (0.5 mg total) under the skin every 7 days    Insulin Glargine Solostar (Lantus SoloStar) 100 UNIT/ML SOPN; Inject 0.46 mL (46 Units total) under the skin daily at bedtime Or as directed, TDD up to 60 units daily    Albumin / creatinine urine ratio; Future    Lipid panel; Future    Hemoglobin A1C; Future    Vitamin D 25 hydroxy; Future    Comprehensive metabolic panel; Future    Hypothyroidism, unspecified type  Recent Thyroid labs stable. To continue LT4 at 25mcg daily.     Orders:    levothyroxine (Euthyrox) 25 mcg tablet; Take 1 tablet (25 mcg total) by mouth daily    Mixed hyperlipidemia  On statin.         I have spent a total time of 35 minutes in caring for this patient on the day of the visit/encounter including Diagnostic results, Impressions, Counseling / Coordination of care, Documenting in the medical record, and CGM report review.   .   History of Present Illness   HPI  Rebecca Shaw is a 44 y.o. female who presents for routine follow up for type 2 DM,  hypothyroidism.  Most recent endocrinology visit: 2/16/24.    Rebecca reports a lot of stress with her housing situation. Her sleep quality is horrible.     Diet - eating once a day, snacks once in awhile. Dietary choices are admittedly not good, but trying to eat more fruit versus junk food. Trying to drink more diet and sugar free beverages. She enjoys pasta, bread.  Dietitian visit - no dietitian visit since diagnosis.     Exercise habits currently are limited due to neuropathy of lower extremities. Gym visits once a month.     DM eye exams: up to date, [facility]. No retinopathy she is aware of.   DM foot exams: no foot exams recently - PCP usually performs - [facility]    DM medications reviewed. See list for details.   She ran out Lantus x 2 weeks, which has contributed to severe hyperglycemia.     For hypothyroidism she takes levothyroxine 25mcg daily. She has been without LT4 for about a month - just ran out.     Takes Florinef for hypotension, no formal diagnosis or workup for adrenal insufficiency that she is aware of or that I can find in recent records. Florinef started after hospitalization out of area 3-4 years ago.   She reports fatigue, hair loss, generalized weakness, brain fog, dry skin. No stretch marks. She is bruising easily.    Tobacco use - none but does vape.    CGM report review:   Rebecca Shaw   Device used : Soraya 3, Home use   Indication: Type 2 Diabetes  More than 72 hours of data was reviewed. Report to be scanned to chart.   Date Range: 1/18/25 - 2/14/25    Analysis of data:   % time CGM used: 37% high  Average Glucose: 294mg/dL  Coefficient of Variation: 19.3%  GMI: 10.3%  Time in Target Range: 1% in range  Time Above Range: 22% high, 77% very high  Time Below Range: 0% low, 0% very low    Interpretation of data:  very poor control, only 12% TIR.            Review of Systems  Medical History Reviewed by provider this encounter:  Tobacco  Allergies  Meds  Problems  " Med Hx  Surg Hx  Fam Hx     .     Objective   /68 (Patient Position: Sitting, Cuff Size: Standard)   Temp (!) 96.6 °F (35.9 °C) (Temporal)   Ht 5' 3\" (1.6 m)   Wt 101 kg (223 lb)   BMI 39.50 kg/m²      Physical Exam  Cardiovascular:      Pulses: Pulses are weak.           Dorsalis pedis pulses are 1+ on the right side and 1+ on the left side.        Posterior tibial pulses are 1+ on the right side and 1+ on the left side.   Feet:      Right foot:      Skin integrity: No ulcer, skin breakdown, erythema, warmth, callus or dry skin.      Left foot:      Skin integrity: No ulcer, skin breakdown, erythema, warmth, callus or dry skin.       Diabetic Foot Exam    Patient's shoes and socks removed.    Right Foot/Ankle   Right Foot Inspection  Skin Exam: skin normal and skin intact. No dry skin, no warmth, no callus, no erythema, no maceration, no abnormal color, no pre-ulcer, no ulcer and no callus.     Toe Exam:  no right toe deformity    Sensory   Monofilament testing: diminished    Vascular  The right DP pulse is 1+. The right PT pulse is 1+.     Left Foot/Ankle  Left Foot Inspection  Skin Exam: skin normal and skin intact. No dry skin, no warmth, no erythema, no maceration, normal color, no pre-ulcer, no ulcer and no callus.     Toe Exam: No left toe deformity.     Sensory   Monofilament testing: intact    Vascular  The left DP pulse is 1+. The left PT pulse is 1+.     Assign Risk Category  No deformity present  Loss of protective sensation  Weak pulses  Risk: 2        Lab results reviewed:    Component      Latest Ref Rng 2/12/2024 10/8/2024   Cholesterol      See Comment mg/dL 135  109    Triglycerides      See Comment mg/dL 192 (H)  219 (H)    HDL      >=50 mg/dL 47 (L)  39 (L)    LDL Calculated      0 - 100 mg/dL 50  26    Non-HDL Cholesterol      mg/dl  70    EXT Creatinine Urine      Reference range not established. mg/dL 118.2     Albumin,U,Random      <20.0 mg/L <7.0     Albumin Creat Ratio      0 " - 30 mg/g creatinine <6     Hemoglobin A1C      Normal 4.0-5.6%; PreDiabetic 5.7-6.4%; Diabetic >=6.5%; Glycemic control for adults with diabetes <7.0% %  10.1 (H)    eAG, EST AVG Glucose      mg/dl  243    TSH 3RD GENERATON      0.450 - 4.500 uIU/mL  1.927    T3, Total      0.9-1.8 ng/mL ng/mL  1.3    T4, TOTAL      6.09 - 12.23 ug/dL  7.36       Legend:  (H) High  (L) Low

## 2025-02-19 ENCOUNTER — TELEPHONE (OUTPATIENT)
Age: 45
End: 2025-02-19

## 2025-02-19 NOTE — TELEPHONE ENCOUNTER
PA for (Lantus SoloStar) 100 UNIT/ML SUBMITTED    via      [x][employer]-Case ID # [id number]    [x]PA sent as URGENT    All office notes, labs and other pertaining documents and studies sent. Clinical questions answered. Awaiting determination from insurance company.     Turnaround time for your insurance to make a decision on your Prior Authorization can take 7-21 business days.

## 2025-02-27 NOTE — TELEPHONE ENCOUNTER
PA for (Lantus SoloStar) 100 UNIT/ML  NOT REQUIRED     Reason (screenshot if applicable)          Patient advised by          [x] MyChart Message  [] Phone call   []LMOM  []L/M to call office as no active Communication consent on file  []Unable to leave detailed message as VM not approved on Communication consent       Pharmacy advised by    [x]Fax  []Phone call

## 2025-03-12 DIAGNOSIS — M79.18 MYOFASCIAL PAIN SYNDROME: ICD-10-CM

## 2025-03-13 RX ORDER — PREGABALIN 150 MG/1
150 CAPSULE ORAL 2 TIMES DAILY
Qty: 60 CAPSULE | Refills: 0 | Status: SHIPPED | OUTPATIENT
Start: 2025-03-13

## 2025-03-24 ENCOUNTER — TELEPHONE (OUTPATIENT)
Dept: FAMILY MEDICINE CLINIC | Facility: CLINIC | Age: 45
End: 2025-03-24

## 2025-03-24 NOTE — TELEPHONE ENCOUNTER
Attempted to reach Natacha to reschedule her physical that she missed today with Dr. Jovel. Patient didn't answer/ unable to leave a VM.

## 2025-04-09 DIAGNOSIS — R13.10 DYSPHAGIA: ICD-10-CM

## 2025-04-09 RX ORDER — PANTOPRAZOLE SODIUM 20 MG/1
20 TABLET, DELAYED RELEASE ORAL
Qty: 180 TABLET | Refills: 5 | Status: SHIPPED | OUTPATIENT
Start: 2025-04-09

## 2025-04-10 ENCOUNTER — APPOINTMENT (OUTPATIENT)
Dept: LAB | Facility: CLINIC | Age: 45
End: 2025-04-10
Payer: COMMERCIAL

## 2025-04-10 DIAGNOSIS — E11.42 TYPE 2 DIABETES MELLITUS WITH DIABETIC POLYNEUROPATHY, WITH LONG-TERM CURRENT USE OF INSULIN (HCC): ICD-10-CM

## 2025-04-10 DIAGNOSIS — Z79.4 TYPE 2 DIABETES MELLITUS WITH DIABETIC POLYNEUROPATHY, WITH LONG-TERM CURRENT USE OF INSULIN (HCC): ICD-10-CM

## 2025-04-10 DIAGNOSIS — E03.9 HYPOTHYROIDISM, UNSPECIFIED TYPE: ICD-10-CM

## 2025-04-10 DIAGNOSIS — E78.2 MIXED HYPERLIPIDEMIA: ICD-10-CM

## 2025-04-10 LAB
25(OH)D3 SERPL-MCNC: 36.1 NG/ML (ref 30–100)
ALBUMIN SERPL BCG-MCNC: 3.9 G/DL (ref 3.5–5)
ALP SERPL-CCNC: 80 U/L (ref 34–104)
ALT SERPL W P-5'-P-CCNC: 29 U/L (ref 7–52)
ANION GAP SERPL CALCULATED.3IONS-SCNC: 8 MMOL/L (ref 4–13)
AST SERPL W P-5'-P-CCNC: 27 U/L (ref 13–39)
BILIRUB SERPL-MCNC: 0.34 MG/DL (ref 0.2–1)
BUN SERPL-MCNC: 12 MG/DL (ref 5–25)
CALCIUM SERPL-MCNC: 8.7 MG/DL (ref 8.4–10.2)
CHLORIDE SERPL-SCNC: 102 MMOL/L (ref 96–108)
CHOLEST SERPL-MCNC: 109 MG/DL (ref ?–200)
CO2 SERPL-SCNC: 25 MMOL/L (ref 21–32)
CREAT SERPL-MCNC: 0.93 MG/DL (ref 0.6–1.3)
CREAT UR-MCNC: 52.8 MG/DL
GFR SERPL CREATININE-BSD FRML MDRD: 75 ML/MIN/1.73SQ M
GLUCOSE P FAST SERPL-MCNC: 291 MG/DL (ref 65–99)
HDLC SERPL-MCNC: 37 MG/DL
LDLC SERPL CALC-MCNC: 40 MG/DL (ref 0–100)
MICROALBUMIN UR-MCNC: 7.2 MG/L
MICROALBUMIN/CREAT 24H UR: 14 MG/G CREATININE (ref 0–30)
NONHDLC SERPL-MCNC: 72 MG/DL
POTASSIUM SERPL-SCNC: 3.6 MMOL/L (ref 3.5–5.3)
PROT SERPL-MCNC: 7.2 G/DL (ref 6.4–8.4)
SODIUM SERPL-SCNC: 135 MMOL/L (ref 135–147)
T4 FREE SERPL-MCNC: 0.84 NG/DL (ref 0.61–1.12)
TRIGL SERPL-MCNC: 161 MG/DL (ref ?–150)
TSH SERPL DL<=0.05 MIU/L-ACNC: 2.88 UIU/ML (ref 0.45–4.5)

## 2025-04-10 PROCEDURE — 82043 UR ALBUMIN QUANTITATIVE: CPT

## 2025-04-10 PROCEDURE — 84443 ASSAY THYROID STIM HORMONE: CPT

## 2025-04-10 PROCEDURE — 80053 COMPREHEN METABOLIC PANEL: CPT

## 2025-04-10 PROCEDURE — 84439 ASSAY OF FREE THYROXINE: CPT

## 2025-04-10 PROCEDURE — 80061 LIPID PANEL: CPT

## 2025-04-10 PROCEDURE — 83036 HEMOGLOBIN GLYCOSYLATED A1C: CPT

## 2025-04-10 PROCEDURE — 36415 COLL VENOUS BLD VENIPUNCTURE: CPT

## 2025-04-10 PROCEDURE — 82570 ASSAY OF URINE CREATININE: CPT

## 2025-04-10 PROCEDURE — 82306 VITAMIN D 25 HYDROXY: CPT

## 2025-04-11 ENCOUNTER — RESULTS FOLLOW-UP (OUTPATIENT)
Dept: ENDOCRINOLOGY | Facility: CLINIC | Age: 45
End: 2025-04-11

## 2025-04-11 LAB
EST. AVERAGE GLUCOSE BLD GHB EST-MCNC: 192 MG/DL
HBA1C MFR BLD: 8.3 %

## 2025-05-11 DIAGNOSIS — M79.18 MYOFASCIAL PAIN SYNDROME: ICD-10-CM

## 2025-05-12 DIAGNOSIS — R00.2 HEART PALPITATIONS: ICD-10-CM

## 2025-05-12 RX ORDER — PREGABALIN 150 MG/1
150 CAPSULE ORAL 2 TIMES DAILY
Qty: 60 CAPSULE | OUTPATIENT
Start: 2025-05-12

## 2025-05-12 RX ORDER — METOPROLOL SUCCINATE 25 MG/1
25 TABLET, EXTENDED RELEASE ORAL DAILY
Qty: 90 TABLET | Refills: 1 | OUTPATIENT
Start: 2025-05-12

## 2025-05-13 ENCOUNTER — OFFICE VISIT (OUTPATIENT)
Dept: FAMILY MEDICINE CLINIC | Facility: CLINIC | Age: 45
End: 2025-05-13
Payer: COMMERCIAL

## 2025-05-13 VITALS
DIASTOLIC BLOOD PRESSURE: 71 MMHG | BODY MASS INDEX: 38.79 KG/M2 | HEIGHT: 63 IN | OXYGEN SATURATION: 96 % | HEART RATE: 85 BPM | WEIGHT: 218.92 LBS | TEMPERATURE: 97.6 F | SYSTOLIC BLOOD PRESSURE: 125 MMHG

## 2025-05-13 DIAGNOSIS — M79.18 MYOFASCIAL PAIN SYNDROME: ICD-10-CM

## 2025-05-13 DIAGNOSIS — J45.20 MILD INTERMITTENT ASTHMA WITHOUT COMPLICATION: ICD-10-CM

## 2025-05-13 DIAGNOSIS — F33.2 MAJOR DEPRESSIVE DISORDER, RECURRENT SEVERE WITHOUT PSYCHOTIC FEATURES (HCC): ICD-10-CM

## 2025-05-13 DIAGNOSIS — Z12.31 ENCOUNTER FOR SCREENING MAMMOGRAM FOR BREAST CANCER: ICD-10-CM

## 2025-05-13 DIAGNOSIS — R13.10 DYSPHAGIA, UNSPECIFIED TYPE: ICD-10-CM

## 2025-05-13 DIAGNOSIS — E78.2 MIXED HYPERLIPIDEMIA: ICD-10-CM

## 2025-05-13 DIAGNOSIS — F43.10 PTSD (POST-TRAUMATIC STRESS DISORDER): ICD-10-CM

## 2025-05-13 DIAGNOSIS — K04.7 DENTAL INFECTION: ICD-10-CM

## 2025-05-13 DIAGNOSIS — G89.29 CHRONIC BILATERAL LOW BACK PAIN WITHOUT SCIATICA: ICD-10-CM

## 2025-05-13 DIAGNOSIS — G43.009 MIGRAINE WITHOUT AURA AND WITHOUT STATUS MIGRAINOSUS, NOT INTRACTABLE: ICD-10-CM

## 2025-05-13 DIAGNOSIS — D50.8 IRON DEFICIENCY ANEMIA SECONDARY TO INADEQUATE DIETARY IRON INTAKE: ICD-10-CM

## 2025-05-13 DIAGNOSIS — K21.9 GASTROESOPHAGEAL REFLUX DISEASE WITHOUT ESOPHAGITIS: ICD-10-CM

## 2025-05-13 DIAGNOSIS — D12.6 ADENOMATOUS POLYP OF COLON, UNSPECIFIED PART OF COLON: ICD-10-CM

## 2025-05-13 DIAGNOSIS — I25.10 CORONARY ARTERY DISEASE INVOLVING NATIVE CORONARY ARTERY OF NATIVE HEART WITHOUT ANGINA PECTORIS: ICD-10-CM

## 2025-05-13 DIAGNOSIS — I95.1 ORTHOSTATIC HYPOTENSION: ICD-10-CM

## 2025-05-13 DIAGNOSIS — Z79.4 TYPE 2 DIABETES MELLITUS WITH DIABETIC POLYNEUROPATHY, WITH LONG-TERM CURRENT USE OF INSULIN (HCC): ICD-10-CM

## 2025-05-13 DIAGNOSIS — Z00.00 ANNUAL PHYSICAL EXAM: Primary | ICD-10-CM

## 2025-05-13 DIAGNOSIS — E11.42 TYPE 2 DIABETES MELLITUS WITH DIABETIC POLYNEUROPATHY, WITH LONG-TERM CURRENT USE OF INSULIN (HCC): ICD-10-CM

## 2025-05-13 DIAGNOSIS — K58.1 IRRITABLE BOWEL SYNDROME WITH CONSTIPATION: ICD-10-CM

## 2025-05-13 DIAGNOSIS — E03.9 HYPOTHYROIDISM, UNSPECIFIED TYPE: ICD-10-CM

## 2025-05-13 DIAGNOSIS — M54.50 CHRONIC BILATERAL LOW BACK PAIN WITHOUT SCIATICA: ICD-10-CM

## 2025-05-13 PROBLEM — R25.1 TREMOR: Status: RESOLVED | Noted: 2020-03-03 | Resolved: 2025-05-13

## 2025-05-13 PROBLEM — Z53.20 SCREENING MAMMOGRAPHY DECLINED: Status: RESOLVED | Noted: 2024-03-22 | Resolved: 2025-05-13

## 2025-05-13 PROCEDURE — 99214 OFFICE O/P EST MOD 30 MIN: CPT | Performed by: FAMILY MEDICINE

## 2025-05-13 PROCEDURE — 99396 PREV VISIT EST AGE 40-64: CPT | Performed by: FAMILY MEDICINE

## 2025-05-13 RX ORDER — PREGABALIN 150 MG/1
150 CAPSULE ORAL 2 TIMES DAILY
Qty: 180 CAPSULE | Refills: 1 | Status: SHIPPED | OUTPATIENT
Start: 2025-05-13

## 2025-05-13 NOTE — PROGRESS NOTES
Adult Annual Physical  Name: Rebecca Shaw      : 1980      MRN: [id number]  Encounter Provider: Genevieve Jovel DO  Encounter Date: 2025   Encounter department: [facility]    :  Assessment & Plan  Orthostatic hypotension  Patient is on Florinef due to history of hypotension, continue         Migraine without aura and without status migrainosus, not intractable  Chronic, stable  Continue Topamax         Coronary artery disease involving native coronary artery of native heart without angina pectoris  Chronic, stable  No acute symptoms reported  Continue statin, beta-blocker, ASA         Mild intermittent asthma without complication  Chronic, stable  Continue as needed albuterol  Advise vaping cessation         Irritable bowel syndrome with constipation  Needs to re-est with GI  Colonoscopy unsuccessful , overdue for repeat         Gastroesophageal reflux disease without esophagitis  Needs to re-est with GI, referral previously placed  Continue PPI    Orders:    Ambulatory Referral to Gastroenterology; Future    Dysphagia, unspecified type  EGD 2020 Biopsies and brushings were negative.   Had esophageal manometry 21: Acid exposure time is 17.3%; DeMeester score is 66; 50 episodes of reflux with 48 in upright position and had to in recumbent position  All episodes were acid reflux episode  She has not followed up with GI and needs a new referral now that she moved to this area from [place].  Referral previously placed  Continue Protonix 20mg BID    Orders:    Ambulatory Referral to Gastroenterology; Future    Adenomatous polyp of colon, unspecified part of colon  Had colonoscopy in  for monitoring of colon polyps. Prep was inadequate and she was to repeat scope a year later but has not done so yet. She moved to this area and needs a referral.  Referral previously placed but patient had to cancel the appointment due to difficulty with  transportation.    Orders:    Ambulatory Referral to Gastroenterology; Future    Type 2 diabetes mellitus with diabetic polyneuropathy, with long-term current use of insulin (HCC)    Lab Results   Component Value Date    HGBA1C 8.3 (H) 04/10/2025     Has FS ronnie device - reports 90 day 8.4% average A1C  Continue Insulin: 46u HS, mealtime 12u +SSI  Also now on Ozempic 0.5mg weekly   Continue Lyrica 150mg BID for polyneuropathy and pain  Patient overdue for follow up with her podiatrist - encouraged her to schedule this  DM eye exam [facility]  Following with endo but struggles with transportation to make appts  On Statin  F/u 3 mo         Hypothyroidism, unspecified type      Lab Results   Component Value Date    LQQ4ZHPRTSXC 2.876 04/10/2025    FREET4 0.84 04/10/2025       Continue Levothyroxine 25mcg daily         Myofascial pain syndrome  Continue Lyrica.  Will resume patient at previous dose of 150 twice a day.      Orders:    pregabalin (LYRICA) 150 mg capsule; Take 1 capsule (150 mg total) by mouth 2 (two) times a day    PTSD (post-traumatic stress disorder)  Patient follows closely with psychiatry and therapist  Continue current medications         Major depressive disorder, recurrent severe without psychotic features (HCC)  Patient follows with psychiatry and therapist on a regular basis, and they manage her medications  Continue Abilify, Depakote, trazodone, Effexor, remeron          Iron deficiency anemia secondary to inadequate dietary iron intake  CBC and iron panel within normal limits   Continue daily iron tab         Chronic bilateral low back pain without sciatica  Continue Lyrica 150 mg twice daily           Mixed hyperlipidemia  Lab Results   Component Value Date    CHOLESTEROL 109 04/10/2025    TRIG 161 (H) 04/10/2025    HDL 37 (L) 04/10/2025    LDLCALC 40 04/10/2025     Continue atorvastatin         Annual physical exam         Dental infection  Patient needs to call dentist ASAP to be seen    Abx started today, if symptoms do not improve with abx se would need to go to ER     Orders:    amoxicillin-clavulanate (AUGMENTIN) 875-125 mg per tablet; Take 1 tablet by mouth every 12 (twelve) hours for 7 days    Encounter for screening mammogram for breast cancer    Orders:    Mammo screening bilateral w 3d and cad; Future      Return in about 3 months (around 8/13/2025) for Follow up chronic health .    Preventive Screenings:  - Diabetes Screening: screening not indicated and has diabetes  - Cholesterol Screening: screening not indicated and has hyperlipidemia   - Hepatitis C screening: screening up-to-date   - HIV screening: screening up-to-date   - Cervical cancer screening: orders placed and risks/benefits discussed   - Breast cancer screening: risks/benefits discussed and orders placed   - Colon cancer screening: orders placed and risks/benefits discussed   - Lung cancer screening: screening not indicated     Patient referred to GI for colonosocpy  Mammo ordered  Patient to return here for pap screening        Immunizations:    - The patient declines recommended vaccines currently despite my recommendations      Counseling/Anticipatory Guidance:  - Alcohol: discussed moderation in alcohol intake and recommendations for healthy alcohol use.   - Tobacco use: discussed harms of tobacco use and management options for quitting.   - Dental health: discussed importance of regular tooth brushing, flossing, and dental visits.   - Diet: discussed recommendations for a healthy/well-balanced diet.   - Exercise: the importance of regular exercise/physical activity was discussed. Recommend exercise 3-5 times per week for at least 30 minutes.       Depression Screening and Follow-up Plan: Patient's depression screening was positive with a PHQ-9 score of 16.   Continue regular follow-up with their mental health provider who is managing their mental health condition(s). Patient with underlying depression and was advised  to continue current medications as prescribed.       Return in about 3 months (around 8/13/2025) for Follow up chronic health .    History of Present Illness       Chief Complaint   Patient presents with    Medication Refill    Physical Exam       Adult Annual Physical:  Patient presents for annual physical.     Diet and Physical Activity:  - Diet/Nutrition: no special diet.  - Exercise: walking, 3-4 times a week on average and less than 30 minutes on average.    Depression Screening:    - PHQ-9 Score: 16    General Health:  - Sleep: 4-6 hours of sleep on average.  - Hearing: normal hearing bilateral ears.  - Vision: most recent eye exam < 1 year ago.  - Dental: no dental visits for > 1 year.    /GYN Health:  - Follows with GYN: no.   - Menopause: premenopausal.   - Last menstrual cycle: 4/9/2025.   - History of STDs: no  - Contraception: tubal ligation.      Review of Systems   Constitutional:  Negative for activity change, appetite change, chills, diaphoresis, fever and unexpected weight change.   HENT:  Positive for dental problem and facial swelling. Negative for congestion, rhinorrhea, sore throat and trouble swallowing.    Eyes:  Negative for visual disturbance.   Respiratory:  Negative for cough, shortness of breath and wheezing.    Cardiovascular:  Negative for chest pain, palpitations and leg swelling.   Gastrointestinal:  Negative for abdominal pain, blood in stool, constipation and diarrhea.   Genitourinary:  Negative for difficulty urinating, dysuria, frequency and hematuria.   Musculoskeletal:  Negative for arthralgias, back pain and joint swelling.   Skin:  Negative for color change and rash.   Neurological:  Negative for dizziness, light-headedness and headaches.   Psychiatric/Behavioral:  Negative for dysphoric mood. The patient is not nervous/anxious.      Current Outpatient Medications on File Prior to Visit   Medication Sig Dispense Refill    albuterol (ProAir HFA) 90 mcg/act inhaler Inhale 2  puffs every 6 (six) hours as needed for wheezing 8.5 g 0    ARIPiprazole (ABILIFY) 5 mg tablet Take 5 mg by mouth daily      aspirin (Aspirin Low Dose) 81 mg EC tablet TAKE 1 TAB BY MOUTH DAILY 100 tablet 1    atorvastatin (LIPITOR) 20 mg tablet take 1 tablet by mouth daily with dinner 100 tablet 1    Blood Glucose Monitoring Suppl (OneTouch Verio Reflect) w/Device KIT Check blood sugars four times daily. Please substitute with appropriate alternative as covered by patient's insurance. Dx: E11.65 1 kit 0    Continuous Blood Gluc Sensor (FreeStyle Soraya 3 Sensor) MISC Use 1 each every 14 (fourteen) days 6 each 3    diclofenac (VOLTAREN) 75 mg EC tablet Take 1 tablet (75 mg total) by mouth if needed (pain)      divalproex sodium (DEPAKOTE ER) 250 mg 24 hr tablet Take 1,000 mg by mouth daily      FeroSul 325 (65 Fe) MG tablet take 1 tablet by mouth once daily WITH BREAKFAST 30 tablet 5    fludrocortisone (FLORINEF) 0.1 mg tablet take 1 tablet by mouth twice a day 180 tablet 0    Insulin Glargine Solostar (Lantus SoloStar) 100 UNIT/ML SOPN Inject 0.46 mL (46 Units total) under the skin daily at bedtime Or as directed, TDD up to 60 units daily 54 mL 3    insulin lispro (HumaLOG) 100 units/mL injection pen inject 12 units subcutaneously three times a day with meals 15 mL 0    Insulin Pen Needle (Pen Needles) 30G X 5 MM MISC Use 2 (two) times a day 100 each 2    levothyroxine (Euthyrox) 25 mcg tablet Take 1 tablet (25 mcg total) by mouth daily 90 tablet 3    methocarbamol (ROBAXIN) 500 mg tablet Take 1 tablet (500 mg total) by mouth 3 (three) times a day 90 tablet 1    metoprolol succinate (TOPROL-XL) 25 mg 24 hr tablet take 1 tablet by mouth once daily 90 tablet 1    mirtazapine (REMERON) 7.5 MG tablet Take 7.5 mg by mouth daily at bedtime      OneTouch Delica Lancets 33G MISC Check blood sugars four times daily. Please substitute with appropriate alternative as covered by patient's insurance. Dx: E11.65 400 each 3     "pantoprazole (PROTONIX) 20 mg tablet take 1 tablet by mouth twice a day before meals 180 tablet 5    semaglutide, 0.25 or 0.5 mg/dose, (Ozempic, 0.25 or 0.5 MG/DOSE,) 2 mg/3 mL injection pen Inject 0.75 mL (0.5 mg total) under the skin every 7 days 3 mL 2    topiramate (TOPAMAX) 50 MG tablet take 1 tablet by mouth twice a day 180 tablet 1    traZODone (DESYREL) 100 mg tablet Take 200 mg by mouth daily at bedtime      venlafaxine (EFFEXOR-XR) 150 mg 24 hr capsule Take 1 capsule (150 mg total) by mouth daily      venlafaxine (EFFEXOR-XR) 75 mg 24 hr capsule Take 1 capsule (75 mg total) by mouth daily      [DISCONTINUED] pregabalin (LYRICA) 150 mg capsule take 1 capsule by mouth twice a day 60 capsule 0     No current facility-administered medications on file prior to visit.      Social History     Tobacco Use    Smoking status: Former     Current packs/day: 0.00     Types: Cigarettes     Quit date: 2018     Years since quittin.5    Smokeless tobacco: Never    Tobacco comments:     occassionally   Vaping Use    Vaping status: Every Day    Substances: Nicotine, Flavoring   Substance and Sexual Activity    Alcohol use: Yes     Comment: occasional    Drug use: No    Sexual activity: Yes     Partners: Male       Objective   /71 (BP Location: Right arm, Patient Position: Sitting, Cuff Size: Large)   Pulse 85   Temp 97.6 °F (36.4 °C) (Tympanic)   Ht 5' 3\" (1.6 m)   Wt 99.3 kg (218 lb 14.7 oz)   SpO2 96%   BMI 38.78 kg/m²     Physical Exam  Vitals reviewed.   Constitutional:       Appearance: Normal appearance. She is obese.   HENT:      Head: Normocephalic and atraumatic.      Right Ear: Tympanic membrane, ear canal and external ear normal. There is no impacted cerumen.      Left Ear: Tympanic membrane, ear canal and external ear normal. There is no impacted cerumen.      Nose: Nose normal. No congestion.      Mouth/Throat:      Mouth: Mucous membranes are moist.      Dentition: Abnormal dentition. Dental " tenderness, gingival swelling and dental caries present.      Pharynx: Oropharynx is clear. No oropharyngeal exudate.   Eyes:      Extraocular Movements: Extraocular movements intact.      Conjunctiva/sclera: Conjunctivae normal.   Neck:      Thyroid: No thyroid mass or thyromegaly.   Cardiovascular:      Rate and Rhythm: Normal rate and regular rhythm.      Pulses: Normal pulses.      Heart sounds: Normal heart sounds. No murmur heard.  Pulmonary:      Effort: Pulmonary effort is normal. No respiratory distress.      Breath sounds: Normal breath sounds. No wheezing.   Abdominal:      General: Abdomen is flat. Bowel sounds are normal. There is no distension.      Palpations: Abdomen is soft.      Tenderness: There is no abdominal tenderness.   Musculoskeletal:      Cervical back: Normal range of motion and neck supple.      Right lower leg: No edema.      Left lower leg: No edema.   Skin:     General: Skin is warm and dry.   Neurological:      General: No focal deficit present.      Mental Status: She is alert.   Psychiatric:         Mood and Affect: Mood normal.         Behavior: Behavior normal.

## 2025-05-13 NOTE — ASSESSMENT & PLAN NOTE
Had colonoscopy in 2020 for monitoring of colon polyps. Prep was inadequate and she was to repeat scope a year later but has not done so yet. She moved to this area and needs a referral.  Referral previously placed but patient had to cancel the appointment due to difficulty with transportation.    Orders:    Ambulatory Referral to Gastroenterology; Future

## 2025-05-13 NOTE — ASSESSMENT & PLAN NOTE
Lab Results   Component Value Date    HGBA1C 8.3 (H) 04/10/2025     Has FS ronnie device - reports 90 day 8.4% average A1C  Continue Insulin: 46u HS, mealtime 12u +SSI  Also now on Ozempic 0.5mg weekly   Continue Lyrica 150mg BID for polyneuropathy and pain  Patient overdue for follow up with her podiatrist - encouraged her to schedule this  DM eye exam [facility]  Following with [facility] but struggles with transportation to make appts  On Statin  F/u 3 mo

## 2025-05-13 NOTE — ASSESSMENT & PLAN NOTE
Lab Results   Component Value Date    CHOLESTEROL 109 04/10/2025    TRIG 161 (H) 04/10/2025    HDL 37 (L) 04/10/2025    LDLCALC 40 04/10/2025     Continue atorvastatin

## 2025-05-13 NOTE — ASSESSMENT & PLAN NOTE
Lab Results   Component Value Date    [id number] 2.876 04/10/2025    FREET4 0.84 04/10/2025       Continue Levothyroxine 25mcg daily

## 2025-05-13 NOTE — ASSESSMENT & PLAN NOTE
Needs to re-est with GI, referral previously placed  Continue PPI    Orders:    Ambulatory Referral to Gastroenterology; Future

## 2025-05-13 NOTE — ASSESSMENT & PLAN NOTE
Continue Lyrica.  Will resume patient at previous dose of 150 twice a day.      Orders:    pregabalin (LYRICA) 150 mg capsule; Take 1 capsule (150 mg total) by mouth 2 (two) times a day

## 2025-05-13 NOTE — ASSESSMENT & PLAN NOTE
Patient follows with psychiatry and therapist on a regular basis, and they manage her medications  Continue Abilify, Depakote, trazodone, Effexor, remeron

## 2025-05-13 NOTE — ASSESSMENT & PLAN NOTE
EGD 6/2020 Biopsies and brushings were negative.   Had esophageal manometry 7/22/21: Acid exposure time is 17.3%; DeMeester score is 66; 50 episodes of reflux with 48 in upright position and had to in recumbent position  All episodes were acid reflux episode  She has not followed up with GI and needs a new referral now that she moved to this area from [place].  Referral previously placed  Continue Protonix 20mg BID    Orders:    Ambulatory Referral to Gastroenterology; Future

## 2025-05-16 DIAGNOSIS — I95.1 ORTHOSTATIC HYPOTENSION: ICD-10-CM

## 2025-05-16 RX ORDER — FLUDROCORTISONE ACETATE 0.1 MG/1
0.1 TABLET ORAL 2 TIMES DAILY
Qty: 180 TABLET | Refills: 0 | Status: SHIPPED | OUTPATIENT
Start: 2025-05-16

## 2025-05-19 NOTE — PROGRESS NOTES
"Subjective     Rebecca Shaw 44 y.o. Female  who presents for routine GYN exam. Periods are irregular, lasting every \"unknown\" days, lasting 5 days. Dysmenorrhea: mild Cyclic symptoms include back pain. No intermenstrual bleeding, spotting, or discharge.    Current contraception: s/p tubal ligation   History of abnormal Pap smear: none   Family history of uterine or ovarian cancer: none  Family history of breast cancer: none  Family history of cervical Cancer none  History of abnormal mammo: no - has mammo ordered but has not scheduled yet.     Menstrual History:  OB History    Para Term  AB Living   4 4 3 1  4   SAB IAB Ectopic Multiple Live Births             # Outcome Date GA Lbr Dale/2nd Weight Sex Type Anes PTL Lv   4 Term            3 Term            2 Term            1                   Menarche age: 11  LMP about a month ago          The following portions of the patient's history were reviewed and updated as appropriate: allergies, current medications, past family history, past medical history, past social history, past surgical history, and problem list.    Review of Systems  Review of Systems   Genitourinary:  Positive for menstrual problem and vaginal discharge. Negative for dyspareunia, dysuria, frequency, pelvic pain, urgency, vaginal bleeding and vaginal pain.   Irregular periods and occasional white vaginal discharge reported            Objective   Visit Vitals  /67 (BP Location: Right arm, Patient Position: Sitting, Cuff Size: Standard)   Pulse 80   Wt 98.6 kg (217 lb 6 oz)   SpO2 98%   BMI 38.51 kg/m²   OB Status Unknown   Smoking Status Former   BSA 2 m²        Physical Exam    Physical Exam  Exam conducted with a chaperone present.   Chest:   Breasts:     Right: Normal.      Left: Normal.   Genitourinary:     Pubic Area: Rash present.      Labia:         Right: No rash, tenderness or lesion.         Left: No rash, tenderness or lesion.       Urethra: No prolapse, " urethral swelling or urethral lesion.      Vagina: Vaginal discharge and erythema present. No tenderness, bleeding or lesions.      Cervix: Normal.      Uterus: Normal.       Rectum: Normal.   Lymphadenopathy:      Upper Body:      Right upper body: No supraclavicular, axillary or pectoral adenopathy.      Left upper body: No supraclavicular, axillary or pectoral adenopathy.                  Assessment     Normal gyn exam.    Assessment & Plan      1. Vaginal discharge        2. Women's annual routine gynecological examination  Liquid-based pap, screening      3. Screening for cervical cancer  Liquid-based pap, screening      4. Rash  nystatin (MYCOSTATIN) powder    nystatin (MYCOSTATIN) cream      5. Vaginal yeast infection  fluconazole (DIFLUCAN) 150 mg tablet            No orders of the defined types were placed in this encounter.        Return for Next scheduled follow up.    Plan     Await pap smear results  Treat vaginal yeast with diflucan  Treat candidal rash in breast and groin with nystatin   Follow up prn or as schedueld next

## 2025-05-20 ENCOUNTER — PROCEDURE VISIT (OUTPATIENT)
Dept: FAMILY MEDICINE CLINIC | Facility: CLINIC | Age: 45
End: 2025-05-20
Payer: COMMERCIAL

## 2025-05-20 VITALS
BODY MASS INDEX: 38.51 KG/M2 | OXYGEN SATURATION: 98 % | HEART RATE: 80 BPM | WEIGHT: 217.37 LBS | DIASTOLIC BLOOD PRESSURE: 67 MMHG | SYSTOLIC BLOOD PRESSURE: 106 MMHG

## 2025-05-20 DIAGNOSIS — Z12.4 SCREENING FOR CERVICAL CANCER: ICD-10-CM

## 2025-05-20 DIAGNOSIS — B37.31 VAGINAL YEAST INFECTION: ICD-10-CM

## 2025-05-20 DIAGNOSIS — R21 RASH: ICD-10-CM

## 2025-05-20 DIAGNOSIS — N89.8 VAGINAL DISCHARGE: Primary | ICD-10-CM

## 2025-05-20 DIAGNOSIS — Z01.419 WOMEN'S ANNUAL ROUTINE GYNECOLOGICAL EXAMINATION: ICD-10-CM

## 2025-05-20 PROCEDURE — G0476 HPV COMBO ASSAY CA SCREEN: HCPCS | Performed by: FAMILY MEDICINE

## 2025-05-20 PROCEDURE — S0612 ANNUAL GYNECOLOGICAL EXAMINA: HCPCS | Performed by: FAMILY MEDICINE

## 2025-05-20 PROCEDURE — 99213 OFFICE O/P EST LOW 20 MIN: CPT | Performed by: FAMILY MEDICINE

## 2025-05-20 RX ORDER — FLUCONAZOLE 150 MG/1
150 TABLET ORAL
Qty: 2 TABLET | Refills: 0 | Status: SHIPPED | OUTPATIENT
Start: 2025-05-20 | End: 2025-05-24

## 2025-05-20 RX ORDER — NYSTATIN 100000 [USP'U]/G
POWDER TOPICAL 3 TIMES DAILY
Qty: 30 G | Refills: 0 | Status: SHIPPED | OUTPATIENT
Start: 2025-05-20

## 2025-05-20 RX ORDER — NYSTATIN 100000 U/G
CREAM TOPICAL 2 TIMES DAILY
Qty: 30 G | Refills: 0 | Status: SHIPPED | OUTPATIENT
Start: 2025-05-20

## 2025-05-22 LAB
HPV HR 12 DNA CVX QL NAA+PROBE: POSITIVE
HPV16 DNA CVX QL NAA+PROBE: NEGATIVE
HPV18 DNA CVX QL NAA+PROBE: NEGATIVE

## 2025-05-23 ENCOUNTER — RESULTS FOLLOW-UP (OUTPATIENT)
Dept: FAMILY MEDICINE CLINIC | Facility: CLINIC | Age: 45
End: 2025-05-23

## 2025-05-23 DIAGNOSIS — R87.810 ASCUS WITH POSITIVE HIGH RISK HPV CERVICAL: Primary | ICD-10-CM

## 2025-05-23 DIAGNOSIS — R87.610 ASCUS WITH POSITIVE HIGH RISK HPV CERVICAL: Primary | ICD-10-CM

## 2025-05-28 LAB
LAB AP GYN PRIMARY INTERPRETATION: ABNORMAL
Lab: ABNORMAL
PATH INTERP SPEC-IMP: ABNORMAL

## 2025-05-28 PROCEDURE — G0124 SCREEN C/V THIN LAYER BY MD: HCPCS | Performed by: PATHOLOGY

## 2025-05-28 NOTE — TELEPHONE ENCOUNTER
"Attempted to reach Natacha to relay the message per Dr. Jovel \"Please call patient and let he know her pap smear came back positive for \"atypical cells\" and given that her HPV test was positive as well, I need her to see a gynecologist for further evaluation with colposcopy as I do not perform this procedure. I am placing the referral. Please see where patient would like to go and assist with scheduling if needed. Please let me know if there are any questions.\" Patient disconnected the call before I was able to speak.   "

## 2025-05-28 NOTE — TELEPHONE ENCOUNTER
"----- Message from Genevieve Jovel DO sent at 5/28/2025  9:01 AM EDT -----  Please call patient and let he know her pap smear came back positive for \"atypical cells\" and given that her HPV test was positive as well, I need her to see a gynecologist for further evaluation   with colposcopy as I do not perform this procedure. I am placing the referral. Please see where patient would like to go and assist with scheduling if needed. Please let me know if there are any   questions.     Dr. Jovel   ----- Message -----  From: Lab, Background User  Sent: 5/22/2025   7:55 PM EDT  To: Genevieve Jovel DO    "

## 2025-06-10 DIAGNOSIS — E11.42 TYPE 2 DIABETES MELLITUS WITH DIABETIC POLYNEUROPATHY, WITH LONG-TERM CURRENT USE OF INSULIN (HCC): ICD-10-CM

## 2025-06-10 DIAGNOSIS — Z79.4 TYPE 2 DIABETES MELLITUS WITH DIABETIC POLYNEUROPATHY, WITH LONG-TERM CURRENT USE OF INSULIN (HCC): ICD-10-CM

## 2025-06-10 RX ORDER — INSULIN LISPRO 100 [IU]/ML
INJECTION, SOLUTION INTRAVENOUS; SUBCUTANEOUS
Qty: 15 ML | Refills: 1 | Status: SHIPPED | OUTPATIENT
Start: 2025-06-10